# Patient Record
Sex: MALE | Race: WHITE | NOT HISPANIC OR LATINO
[De-identification: names, ages, dates, MRNs, and addresses within clinical notes are randomized per-mention and may not be internally consistent; named-entity substitution may affect disease eponyms.]

---

## 2017-03-23 ENCOUNTER — MEDICATION RENEWAL (OUTPATIENT)
Age: 62
End: 2017-03-23

## 2017-04-25 ENCOUNTER — APPOINTMENT (OUTPATIENT)
Dept: INTERNAL MEDICINE | Facility: CLINIC | Age: 62
End: 2017-04-25

## 2017-04-25 ENCOUNTER — NON-APPOINTMENT (OUTPATIENT)
Age: 62
End: 2017-04-25

## 2017-04-25 VITALS
WEIGHT: 155 LBS | HEIGHT: 69.5 IN | HEART RATE: 63 BPM | OXYGEN SATURATION: 99 % | DIASTOLIC BLOOD PRESSURE: 80 MMHG | BODY MASS INDEX: 22.44 KG/M2 | SYSTOLIC BLOOD PRESSURE: 134 MMHG

## 2017-04-25 DIAGNOSIS — Z87.19 PERSONAL HISTORY OF OTHER DISEASES OF THE DIGESTIVE SYSTEM: ICD-10-CM

## 2017-05-02 LAB
25(OH)D3 SERPL-MCNC: 33.4 NG/ML
ALBUMIN SERPL ELPH-MCNC: 4.1 G/DL
ALP BLD-CCNC: 50 U/L
ALT SERPL-CCNC: 26 U/L
ANION GAP SERPL CALC-SCNC: 16 MMOL/L
APPEARANCE: CLEAR
AST SERPL-CCNC: 25 U/L
BACTERIA: NEGATIVE
BASOPHILS # BLD AUTO: 0.02 K/UL
BASOPHILS NFR BLD AUTO: 0.2 %
BILIRUB SERPL-MCNC: 0.4 MG/DL
BILIRUBIN URINE: NEGATIVE
BLOOD URINE: NEGATIVE
BUN SERPL-MCNC: 30 MG/DL
CALCIUM SERPL-MCNC: 10.3 MG/DL
CHLORIDE SERPL-SCNC: 102 MMOL/L
CHOLEST SERPL-MCNC: 178 MG/DL
CHOLEST/HDLC SERPL: 2.4 RATIO
CO2 SERPL-SCNC: 22 MMOL/L
COLOR: YELLOW
CREAT SERPL-MCNC: 1.1 MG/DL
EOSINOPHIL # BLD AUTO: 0.16 K/UL
EOSINOPHIL NFR BLD AUTO: 1.8 %
GLUCOSE QUALITATIVE U: NORMAL MG/DL
GLUCOSE SERPL-MCNC: 109 MG/DL
HCT VFR BLD CALC: 42.8 %
HDLC SERPL-MCNC: 73 MG/DL
HGB BLD-MCNC: 13.8 G/DL
IMM GRANULOCYTES NFR BLD AUTO: 0.1 %
KETONES URINE: NEGATIVE
LDLC SERPL CALC-MCNC: 95 MG/DL
LEUKOCYTE ESTERASE URINE: NEGATIVE
LYMPHOCYTES # BLD AUTO: 3.95 K/UL
LYMPHOCYTES NFR BLD AUTO: 43.8 %
MAN DIFF?: NORMAL
MCHC RBC-ENTMCNC: 32.2 GM/DL
MCHC RBC-ENTMCNC: 32.2 PG
MCV RBC AUTO: 99.8 FL
MICROSCOPIC-UA: NORMAL
MONOCYTES # BLD AUTO: 0.51 K/UL
MONOCYTES NFR BLD AUTO: 5.7 %
NEUTROPHILS # BLD AUTO: 4.36 K/UL
NEUTROPHILS NFR BLD AUTO: 48.4 %
NITRITE URINE: NEGATIVE
PH URINE: 5.5
PLATELET # BLD AUTO: 252 K/UL
POTASSIUM SERPL-SCNC: 5.2 MMOL/L
PROT SERPL-MCNC: 7.7 G/DL
PROTEIN URINE: NEGATIVE MG/DL
PSA SERPL-MCNC: 2.24 NG/ML
RBC # BLD: 4.29 M/UL
RBC # FLD: 13.3 %
RED BLOOD CELLS URINE: 4 /HPF
SODIUM SERPL-SCNC: 140 MMOL/L
SPECIFIC GRAVITY URINE: 1.03
SQUAMOUS EPITHELIAL CELLS: 0 /HPF
TRIGL SERPL-MCNC: 51 MG/DL
UROBILINOGEN URINE: NORMAL MG/DL
WBC # FLD AUTO: 9.01 K/UL
WHITE BLOOD CELLS URINE: 0 /HPF

## 2017-07-07 ENCOUNTER — MEDICATION RENEWAL (OUTPATIENT)
Age: 62
End: 2017-07-07

## 2018-08-01 ENCOUNTER — RX RENEWAL (OUTPATIENT)
Age: 63
End: 2018-08-01

## 2019-05-22 ENCOUNTER — RX RENEWAL (OUTPATIENT)
Age: 64
End: 2019-05-22

## 2019-05-23 ENCOUNTER — OTHER (OUTPATIENT)
Age: 64
End: 2019-05-23

## 2019-05-23 ENCOUNTER — MEDICATION RENEWAL (OUTPATIENT)
Age: 64
End: 2019-05-23

## 2019-09-11 ENCOUNTER — RX RENEWAL (OUTPATIENT)
Age: 64
End: 2019-09-11

## 2019-11-01 ENCOUNTER — NON-APPOINTMENT (OUTPATIENT)
Age: 64
End: 2019-11-01

## 2019-11-01 ENCOUNTER — LABORATORY RESULT (OUTPATIENT)
Age: 64
End: 2019-11-01

## 2019-11-01 ENCOUNTER — APPOINTMENT (OUTPATIENT)
Dept: INTERNAL MEDICINE | Facility: CLINIC | Age: 64
End: 2019-11-01
Payer: COMMERCIAL

## 2019-11-01 VITALS
HEART RATE: 67 BPM | DIASTOLIC BLOOD PRESSURE: 80 MMHG | HEIGHT: 69.5 IN | OXYGEN SATURATION: 97 % | BODY MASS INDEX: 22.73 KG/M2 | WEIGHT: 157 LBS | SYSTOLIC BLOOD PRESSURE: 120 MMHG

## 2019-11-01 PROCEDURE — G0442 ANNUAL ALCOHOL SCREEN 15 MIN: CPT | Mod: NC

## 2019-11-01 PROCEDURE — 93000 ELECTROCARDIOGRAM COMPLETE: CPT

## 2019-11-01 PROCEDURE — G0444 DEPRESSION SCREEN ANNUAL: CPT | Mod: NC,59

## 2019-11-01 PROCEDURE — 99396 PREV VISIT EST AGE 40-64: CPT | Mod: 25

## 2019-11-04 NOTE — PHYSICAL EXAM
[No Acute Distress] : no acute distress [Well Nourished] : well nourished [Well Developed] : well developed [Well-Appearing] : well-appearing [Normal Sclera/Conjunctiva] : normal sclera/conjunctiva [PERRL] : pupils equal round and reactive to light [EOMI] : extraocular movements intact [Normal Outer Ear/Nose] : the outer ears and nose were normal in appearance [Normal Oropharynx] : the oropharynx was normal [No JVD] : no jugular venous distention [No Lymphadenopathy] : no lymphadenopathy [Supple] : supple [Thyroid Normal, No Nodules] : the thyroid was normal and there were no nodules present [No Respiratory Distress] : no respiratory distress  [No Accessory Muscle Use] : no accessory muscle use [Clear to Auscultation] : lungs were clear to auscultation bilaterally [Normal Rate] : normal rate  [Regular Rhythm] : with a regular rhythm [Normal S1, S2] : normal S1 and S2 [No Murmur] : no murmur heard [No Carotid Bruits] : no carotid bruits [No Abdominal Bruit] : a ~M bruit was not heard ~T in the abdomen [No Varicosities] : no varicosities [Pedal Pulses Present] : the pedal pulses are present [No Edema] : there was no peripheral edema [No Palpable Aorta] : no palpable aorta [No Extremity Clubbing/Cyanosis] : no extremity clubbing/cyanosis [Soft] : abdomen soft [Non Tender] : non-tender [Non-distended] : non-distended [No Masses] : no abdominal mass palpated [No HSM] : no HSM [Normal Bowel Sounds] : normal bowel sounds [Declined Rectal Exam] : declined rectal exam [Normal Posterior Cervical Nodes] : no posterior cervical lymphadenopathy [Normal Anterior Cervical Nodes] : no anterior cervical lymphadenopathy [No CVA Tenderness] : no CVA  tenderness [No Spinal Tenderness] : no spinal tenderness [No Joint Swelling] : no joint swelling [Grossly Normal Strength/Tone] : grossly normal strength/tone [No Rash] : no rash [Coordination Grossly Intact] : coordination grossly intact [No Focal Deficits] : no focal deficits [Normal Gait] : normal gait [Deep Tendon Reflexes (DTR)] : deep tendon reflexes were 2+ and symmetric [Normal Affect] : the affect was normal [Normal Insight/Judgement] : insight and judgment were intact [FreeTextEntry1] : refer for colonsocpy screening [de-identified] : defer

## 2019-11-04 NOTE — HEALTH RISK ASSESSMENT
[] : No [Yes] : Yes [Monthly or less (1 pt)] : Monthly or less (1 point) [1 or 2 (0 pts)] : 1 or 2 (0 points) [Never (0 pts)] : Never (0 points) [No falls in past year] : Patient reported no falls in the past year [0] : 2) Feeling down, depressed, or hopeless: Not at all (0) [Audit-CScore] : 1 [APL0Dmlha] : 0 [BoneDensityComments] : none [Patient/Caregiver not ready to engage] : Patient/Caregiver not ready to engage

## 2019-11-04 NOTE — HISTORY OF PRESENT ILLNESS
[FreeTextEntry1] : 64 year old male, ,  with 2 children, monogomous for past 19 years, seen in 2004, re-establish care in 2012 (last seen by me ),here for CPE. \par He is triathlete, and has run many marathons, running, bicycling and swimming, scheduled to run Oct 201 marathin in [facility]..\par \par He has known RBBB and hypertension with extensive cardiac work-up including CT chest/coronary angiography as indicated in prior notes, most recently had nuclear stress test in 2012 with cardiologist  Dr Haywood that was negative and begun on lipitor for his hyperlipidemia. .\par His blood pressure is controlled on medications since it was noted to be high in 2012 when he was gettingg  treatment for his chronic LBP.  \par He has been an active marathon runner since his mid 30's after his father had CABG and PPM in his mid 60's.  His mother had  from liver disease related to alcohol.  He has hx atypical chest pain for many years, thought to be related to possible GERD as he does drink coffee and orange juice throughout the day, some water .  He does not smoke and drinks only socially, no recreational drugs.\par \par He has hx of 3 arthroscopic surgeries on his knees for meniscal tears, (Two on right, one on left), last one in 2007. He was seen in  for inflammation of the 3rd digit of foot, received steroid injection with response.\par \par Never had colonoscopy.

## 2019-11-18 LAB
25(OH)D3 SERPL-MCNC: 31.9 NG/ML
ALBUMIN SERPL ELPH-MCNC: 4.8 G/DL
ALP BLD-CCNC: 51 U/L
ALT SERPL-CCNC: 21 U/L
ANION GAP SERPL CALC-SCNC: 13 MMOL/L
AST SERPL-CCNC: 19 U/L
BASOPHILS # BLD AUTO: 0.03 K/UL
BASOPHILS NFR BLD AUTO: 0.3 %
BILIRUB SERPL-MCNC: 0.5 MG/DL
BUN SERPL-MCNC: 31 MG/DL
CALCIUM SERPL-MCNC: 10.5 MG/DL
CHLORIDE SERPL-SCNC: 103 MMOL/L
CHOLEST SERPL-MCNC: 210 MG/DL
CHOLEST/HDLC SERPL: 3.1 RATIO
CO2 SERPL-SCNC: 26 MMOL/L
CREAT SERPL-MCNC: 1.09 MG/DL
EOSINOPHIL # BLD AUTO: 0.24 K/UL
EOSINOPHIL NFR BLD AUTO: 2.4 %
ESTIMATED AVERAGE GLUCOSE: 117 MG/DL
GLUCOSE SERPL-MCNC: 112 MG/DL
HBA1C MFR BLD HPLC: 5.7 %
HCT VFR BLD CALC: 48.2 %
HCV AB SER QL: NONREACTIVE
HCV S/CO RATIO: 0.24 S/CO
HDLC SERPL-MCNC: 67 MG/DL
HGB BLD-MCNC: 15.4 G/DL
IMM GRANULOCYTES NFR BLD AUTO: 0.2 %
LDLC SERPL CALC-MCNC: 127 MG/DL
LYMPHOCYTES # BLD AUTO: 5.84 K/UL
LYMPHOCYTES NFR BLD AUTO: 59 %
MAN DIFF?: NORMAL
MCHC RBC-ENTMCNC: 32 GM/DL
MCHC RBC-ENTMCNC: 32.7 PG
MCV RBC AUTO: 102.3 FL
MONOCYTES # BLD AUTO: 0.55 K/UL
MONOCYTES NFR BLD AUTO: 5.6 %
NEUTROPHILS # BLD AUTO: 3.22 K/UL
NEUTROPHILS NFR BLD AUTO: 32.5 %
PLATELET # BLD AUTO: 293 K/UL
POTASSIUM SERPL-SCNC: 5.1 MMOL/L
PROT SERPL-MCNC: 7.5 G/DL
RBC # BLD: 4.71 M/UL
RBC # FLD: 12.8 %
SODIUM SERPL-SCNC: 141 MMOL/L
TRIGL SERPL-MCNC: 79 MG/DL
TSH SERPL-ACNC: 1.65 UIU/ML
WBC # FLD AUTO: 9.9 K/UL

## 2019-11-21 ENCOUNTER — TRANSCRIPTION ENCOUNTER (OUTPATIENT)
Age: 64
End: 2019-11-21

## 2020-10-21 ENCOUNTER — RX RENEWAL (OUTPATIENT)
Age: 65
End: 2020-10-21

## 2021-02-12 ENCOUNTER — TRANSCRIPTION ENCOUNTER (OUTPATIENT)
Age: 66
End: 2021-02-12

## 2021-02-17 ENCOUNTER — NON-APPOINTMENT (OUTPATIENT)
Age: 66
End: 2021-02-17

## 2021-02-18 ENCOUNTER — TRANSCRIPTION ENCOUNTER (OUTPATIENT)
Age: 66
End: 2021-02-18

## 2021-02-18 ENCOUNTER — APPOINTMENT (OUTPATIENT)
Dept: INTERNAL MEDICINE | Facility: CLINIC | Age: 66
End: 2021-02-18
Payer: COMMERCIAL

## 2021-02-18 PROCEDURE — 99213 OFFICE O/P EST LOW 20 MIN: CPT | Mod: 95

## 2021-02-19 ENCOUNTER — APPOINTMENT (OUTPATIENT)
Dept: PULMONOLOGY | Facility: CLINIC | Age: 66
End: 2021-02-19
Payer: COMMERCIAL

## 2021-02-19 VITALS — BODY MASS INDEX: 22.9 KG/M2 | HEIGHT: 70 IN | WEIGHT: 160 LBS

## 2021-02-19 DIAGNOSIS — Z86.39 PERSONAL HISTORY OF OTHER ENDOCRINE, NUTRITIONAL AND METABOLIC DISEASE: ICD-10-CM

## 2021-02-19 DIAGNOSIS — Z87.438 PERSONAL HISTORY OF OTHER DISEASES OF MALE GENITAL ORGANS: ICD-10-CM

## 2021-02-19 DIAGNOSIS — Z86.79 PERSONAL HISTORY OF OTHER DISEASES OF THE CIRCULATORY SYSTEM: ICD-10-CM

## 2021-02-19 PROCEDURE — 99204 OFFICE O/P NEW MOD 45 MIN: CPT | Mod: 95

## 2021-02-19 NOTE — ADDENDUM
[FreeTextEntry1] : Documented by Tamia Cleveland acting as a scribe for Dr. Anthony Hooker on 02/19/2021 \par \par All medical record entries made by the Scribe were at my, Dr. Anthony Hooker's, direction and personally dictated by me on 02/19/2021 . I have reviewed the chart and agree that the record accurately reflects my personal performance of the history, physical exam, assessment and plan. I have also personally directed, reviewed, and agree with the discharge instructions.

## 2021-02-19 NOTE — PHYSICAL EXAM
[No Acute Distress] : no acute distress [Well Nourished] : well nourished [Normal Appearance] : normal appearance [No Deformities] : no deformities [Well Developed] : well developed

## 2021-02-19 NOTE — HISTORY OF PRESENT ILLNESS
[Home] : at home, [unfilled] , at the time of the visit. [Other Location: e.g. Home (Enter Location, City,State)___] : at [unfilled] [Verbal consent obtained from patient] : the patient, [unfilled] [Time Spent: ___ minutes] : I have spent [unfilled] minutes with the patient on the telephone [FreeTextEntry1] : Hx as outlined.\par History of borderline cholesterol on statin therapy and reinforced diet history of borderline hypertension and will adjust diet before beginning medications\par History of hyperlipidemia on statin therapy to follow-up labs today

## 2021-02-19 NOTE — ASSESSMENT
[FreeTextEntry1] : Mr. VILLA  is a 66 year old male with a history of  HTN, cholesterol, BPH,likely suspected COVID-19 infection who now comes to the office via video call  for an initial pulmonary evaluation for suspected COVID with pedestrian symptoms \par \par \par problem 1: likely suspected COVID-19 Infection\par - recommended to quarantine for 10 days \par Immune Support Recommendations:\par -OTC Vitamin C 1000mg BID \par -OTC Quercetin 1000mg BID \par -OTC Zinc 50-100mg per day \par -OTC Melatonin 5mg a night \par -OTC Vitamin D 2000mg per day  \par - recommended hydration \par - recommended Baby Aspirin \par - if positive for COVID-19 no COVID-19 vaccine for 3 months. \par \par problem 2: allergies / sinus\par  -add Flonase 1 sniff/nostril BID\par - Environmental measures for allergies were encouraged including mattress and pillow cover, air purifier, and environmental controls.\par \par \par Problem 3: primary snoring\par - recommended positional sleep \par \par \par problem 4: health maintenance \par - breathing techniques: Wimhof and Buteyko method \par -recommended yearly flu shot after October 15\par -recommended strep pneumonia vaccines: Prevnar-13 vaccine, followed by Pneumo vaccine 23 one year following after 65 years old. \par -recommended early intervention for Upper Respiratory Infections (URIs)\par -recommended regular osteoporosis evaluations\par -recommended early dermatological evaluations\par -recommended after the age of 50 to the age of 70, colonoscopy every 5 years\par \par F/U in 6-8 weeks.\par He is encouraged to call with any changes, concerns, or questions\par \par

## 2021-02-19 NOTE — REASON FOR VISIT
[Initial] : an initial visit [TextBox_44] : via video call - suspected likely COVID-19 infection, primary snoring

## 2021-02-19 NOTE — HISTORY OF PRESENT ILLNESS
[Home] : at home, [unfilled] , at the time of the visit. [Medical Office: ([facility])___] : at the medical office located in  [Verbal consent obtained from patient] : the patient, [unfilled] [TextBox_4] : Mr. VILLA is a 66 year old male presenting to the office today via video call  for initial pulmonary evaluation. His chief complaint is\par - - he notes he was exposed to someone with COVID, he took a test last Friday, his wife tested positive, he tested negative. He has been feeling under the weather but not that sick. \par - he took another test yesterday with Niels. \par - he has been feeling more tired than he usually would be, in the beginning he felt like he had some chest pains, he has had chest pains for years \par -  he denies any SOB \par - no difficulty swallowing \par - no sour taste in the mouth\par - he thinks he has heartburn, he takes an antacid, this started a week ago.\par - he notes he snores \par - he notes his neck size is 15 1/2 \par - he is about 5'10 and weights 160 lbs\par - his memory / concentration is fine \par - no ankle / leg swelling \par - he notes he sometimes gets some allergies \par - he notes he takes Flonase \par - bowels are regular for the most part \par - no hoarseness \par - he wakes up at night to urinate \par -he denies any visual issues, headaches, nausea, vomiting, fever, chills, sweats, chest pain, chest pressure, diarrhea, constipation, dysphagia, dizziness, leg swelling, leg pain, itchy eyes, itchy ears,  sour taste in the mouth, myalgias or arthralgias.

## 2021-02-23 ENCOUNTER — TRANSCRIPTION ENCOUNTER (OUTPATIENT)
Age: 66
End: 2021-02-23

## 2021-02-24 ENCOUNTER — NON-APPOINTMENT (OUTPATIENT)
Age: 66
End: 2021-02-24

## 2021-03-02 ENCOUNTER — APPOINTMENT (OUTPATIENT)
Dept: CARDIOLOGY | Facility: CLINIC | Age: 66
End: 2021-03-02
Payer: COMMERCIAL

## 2021-03-02 ENCOUNTER — NON-APPOINTMENT (OUTPATIENT)
Age: 66
End: 2021-03-02

## 2021-03-02 ENCOUNTER — LABORATORY RESULT (OUTPATIENT)
Age: 66
End: 2021-03-02

## 2021-03-02 VITALS — DIASTOLIC BLOOD PRESSURE: 85 MMHG | SYSTOLIC BLOOD PRESSURE: 150 MMHG

## 2021-03-02 VITALS
WEIGHT: 160 LBS | HEIGHT: 70 IN | BODY MASS INDEX: 22.9 KG/M2 | SYSTOLIC BLOOD PRESSURE: 164 MMHG | HEART RATE: 65 BPM | OXYGEN SATURATION: 100 % | DIASTOLIC BLOOD PRESSURE: 88 MMHG

## 2021-03-02 PROCEDURE — 99072 ADDL SUPL MATRL&STAF TM PHE: CPT

## 2021-03-02 PROCEDURE — 99204 OFFICE O/P NEW MOD 45 MIN: CPT

## 2021-03-02 PROCEDURE — 36415 COLL VENOUS BLD VENIPUNCTURE: CPT

## 2021-03-02 PROCEDURE — 93000 ELECTROCARDIOGRAM COMPLETE: CPT

## 2021-03-02 NOTE — ASSESSMENT
[FreeTextEntry1] : Mr Mart is feeling generally well and is resuming his usual training schedule.  His exam shows elevated blood pressure, regular rhythm, clear lungs, and a normal cardiac exam.  His EKG shows incomplete right bundle branch block which has been noted in the past.\par \par He seems to have recovered from coronavirus and appears to be doing well.  He was instructed to take his blood pressure at home to see if it is consistently elevated.  Complete blood work was drawn.  He will be scheduled for a stress echo.

## 2021-03-02 NOTE — PHYSICAL EXAM
[General Appearance - Well Developed] : well developed [Normal Appearance] : normal appearance [Well Groomed] : well groomed [General Appearance - Well Nourished] : well nourished [No Deformities] : no deformities [General Appearance - In No Acute Distress] : no acute distress [Normal Conjunctiva] : the conjunctiva exhibited no abnormalities [Eyelids - No Xanthelasma] : the eyelids demonstrated no xanthelasmas [Normal Oral Mucosa] : normal oral mucosa [No Oral Pallor] : no oral pallor [No Oral Cyanosis] : no oral cyanosis [Normal Jugular Venous A Waves Present] : normal jugular venous A waves present [Normal Jugular Venous V Waves Present] : normal jugular venous V waves present [No Jugular Venous Mae A Waves] : no jugular venous mae A waves [Heart Rate And Rhythm] : heart rate and rhythm were normal [Heart Sounds] : normal S1 and S2 [Murmurs] : no murmurs present [Respiration, Rhythm And Depth] : normal respiratory rhythm and effort [Exaggerated Use Of Accessory Muscles For Inspiration] : no accessory muscle use [Auscultation Breath Sounds / Voice Sounds] : lungs were clear to auscultation bilaterally [Abdomen Soft] : soft [Abdomen Tenderness] : non-tender [Abdomen Mass (___ Cm)] : no abdominal mass palpated [Abnormal Walk] : normal gait [Gait - Sufficient For Exercise Testing] : the gait was sufficient for exercise testing [Nail Clubbing] : no clubbing of the fingernails [Cyanosis, Localized] : no localized cyanosis [Petechial Hemorrhages (___cm)] : no petechial hemorrhages [Skin Color & Pigmentation] : normal skin color and pigmentation [] : no rash [No Venous Stasis] : no venous stasis [Skin Lesions] : no skin lesions [No Skin Ulcers] : no skin ulcer [No Xanthoma] : no  xanthoma was observed [Oriented To Time, Place, And Person] : oriented to person, place, and time [Affect] : the affect was normal [Mood] : the mood was normal [No Anxiety] : not feeling anxious

## 2021-03-02 NOTE — HISTORY OF PRESENT ILLNESS
[FreeTextEntry1] : 66-year-old male with a history of hypertension and hypercholesterolemia being seen because of concerns of CAD.  He has a family history of premature CAD with his father having had bypass surgery in his 60s and was evaluated by us about 12 years ago with negative work-up.  He remains asymptomatic and is very physically active participating in  long distance running and triathlons and has not had any symptoms.  About 3 weeks ago he developed coronavirus infection with mild symptoms of fever and malaise for a few days.  Is currently asymptomatic and has been gradually returning to usual activities.\par \par He is in otherwise good general health.  He has not seen his internist in more than a year.

## 2021-03-03 LAB
ALBUMIN SERPL ELPH-MCNC: 4.6 G/DL
ALP BLD-CCNC: 69 U/L
ALT SERPL-CCNC: 39 U/L
ANION GAP SERPL CALC-SCNC: 14 MMOL/L
AST SERPL-CCNC: 33 U/L
BASOPHILS # BLD AUTO: 0 K/UL
BASOPHILS NFR BLD AUTO: 0 %
BILIRUB SERPL-MCNC: 0.4 MG/DL
BUN SERPL-MCNC: 29 MG/DL
CALCIUM SERPL-MCNC: 10.1 MG/DL
CHLORIDE SERPL-SCNC: 104 MMOL/L
CHOLEST SERPL-MCNC: 160 MG/DL
CO2 SERPL-SCNC: 21 MMOL/L
CREAT SERPL-MCNC: 1.17 MG/DL
EOSINOPHIL # BLD AUTO: 0 K/UL
EOSINOPHIL NFR BLD AUTO: 0 %
ESTIMATED AVERAGE GLUCOSE: 123 MG/DL
GLUCOSE SERPL-MCNC: 103 MG/DL
HBA1C MFR BLD HPLC: 5.9 %
HCT VFR BLD CALC: 46.5 %
HDLC SERPL-MCNC: 46 MG/DL
HGB BLD-MCNC: 14.8 G/DL
LDLC SERPL CALC-MCNC: 99 MG/DL
LYMPHOCYTES # BLD AUTO: 7.13 K/UL
LYMPHOCYTES NFR BLD AUTO: 60.9 %
MAN DIFF?: NORMAL
MCHC RBC-ENTMCNC: 31.8 GM/DL
MCHC RBC-ENTMCNC: 31.8 PG
MCV RBC AUTO: 100 FL
MONOCYTES # BLD AUTO: 0.61 K/UL
MONOCYTES NFR BLD AUTO: 5.2 %
NEUTROPHILS # BLD AUTO: 3.05 K/UL
NEUTROPHILS NFR BLD AUTO: 26.1 %
NONHDLC SERPL-MCNC: 114 MG/DL
PLATELET # BLD AUTO: 418 K/UL
POTASSIUM SERPL-SCNC: 4.7 MMOL/L
PROT SERPL-MCNC: 7.5 G/DL
PSA SERPL-MCNC: 3.4 NG/ML
RBC # BLD: 4.65 M/UL
RBC # FLD: 12.5 %
SODIUM SERPL-SCNC: 140 MMOL/L
TRIGL SERPL-MCNC: 77 MG/DL
TSH SERPL-ACNC: 1.93 UIU/ML
WBC # FLD AUTO: 11.7 K/UL

## 2021-03-16 ENCOUNTER — TRANSCRIPTION ENCOUNTER (OUTPATIENT)
Age: 66
End: 2021-03-16

## 2021-03-22 ENCOUNTER — APPOINTMENT (OUTPATIENT)
Dept: PULMONOLOGY | Facility: CLINIC | Age: 66
End: 2021-03-22
Payer: COMMERCIAL

## 2021-03-22 VITALS
DIASTOLIC BLOOD PRESSURE: 78 MMHG | WEIGHT: 158 LBS | RESPIRATION RATE: 16 BRPM | BODY MASS INDEX: 23.4 KG/M2 | OXYGEN SATURATION: 98 % | HEART RATE: 65 BPM | HEIGHT: 69 IN | TEMPERATURE: 97.3 F | SYSTOLIC BLOOD PRESSURE: 126 MMHG

## 2021-03-22 PROCEDURE — 99072 ADDL SUPL MATRL&STAF TM PHE: CPT

## 2021-03-22 PROCEDURE — 94010 BREATHING CAPACITY TEST: CPT

## 2021-03-22 PROCEDURE — 71046 X-RAY EXAM CHEST 2 VIEWS: CPT

## 2021-03-22 PROCEDURE — 94729 DIFFUSING CAPACITY: CPT

## 2021-03-22 PROCEDURE — 99214 OFFICE O/P EST MOD 30 MIN: CPT | Mod: 25

## 2021-03-22 PROCEDURE — 94727 GAS DIL/WSHOT DETER LNG VOL: CPT

## 2021-03-22 NOTE — ASSESSMENT
[FreeTextEntry1] : Mr. VILLA  is a 66 year old male with a history of  HTN, cholesterol, BPH,likely suspected COVID-19 infection who now comes to the office for a pulmonary evaluation for suspected COVID with pedestrian symptoms (resolved); still snoring\par \par \par problem 1: s/p suspected COVID-19 Infection\par - recommended to quarantine for 10 days \par -hold vaccine x 3 months\par -complete COVID 19 Antibody screening \par -educated patient on COVID 19 vaccine and appropriate recommendations \par Immune Support Recommendations:\par -OTC Vitamin C 1000mg BID \par -OTC Quercetin 1000mg BID \par -OTC Zinc 50-100mg per day \par -OTC Melatonin 5mg a night \par -OTC Vitamin D 2000mg per day  \par - recommended hydration \par - recommended Baby Aspirin \par \par problem 2: allergies / sinus\par  -continue Flonase 1 sniff/nostril BID\par - Environmental measures for allergies were encouraged including mattress and pillow cover, air purifier, and environmental controls.\par \par Problem 3: primary snoring/ ?RESHMA\par - recommended positional sleep \par -complete home sleep study\par -recommended Slumber Bump\par -recommended Somnifix \par -recommended OxyAid\par Good sleep hygiene was encouraged including wearing sunglasses 30 minutes before bed, avoiding watching television an hour before bed, keeping caffeine at a low, avoiding reading, television, or anything, in bed, no drinking any liquids three hours before bedtime, and only getting into bed when tired and ready for sleep. \par \par problem 4: health maintenance \par - breathing techniques: Wimhof and Buteyko method \par -recommended yearly flu shot after October 15\par -recommended strep pneumonia vaccines: Prevnar-13 vaccine, followed by Pneumo vaccine 23 one year following after 65 years old. \par -recommended early intervention for Upper Respiratory Infections (URIs)\par -recommended regular osteoporosis evaluations\par -recommended early dermatological evaluations\par -recommended after the age of 50 to the age of 70, colonoscopy every 5 years\par \par F/U in 6-8 weeks.\par He is encouraged to call with any changes, concerns, or questions\par \par

## 2021-03-22 NOTE — ADDENDUM
[FreeTextEntry1] : Documented by Van Obrien acting as a scribe for Dr. Anthony Hooker on 03/22/2021.\par \par All medical record entries made by the Scribe were at my, Dr. Anthony Hooker's, direction and personally dictated by me on 03/22/2021 . I have reviewed the chart and agree that the record accurately reflects my personal performance of the history, physical exam, assessment and plan. I have also personally directed, reviewed, and agree with the discharge instructions. \par

## 2021-03-22 NOTE — REASON FOR VISIT
[Follow-Up] : a follow-up visit [TextBox_44] :  suspected likely COVID-19 infection, primary snoring

## 2021-03-22 NOTE — HISTORY OF PRESENT ILLNESS
[TextBox_4] : Mr. VILLA is a 66 year old male presenting to the office today for pulmonary follow up. His chief complaint is\par \par -he notes generally feeling improved\par -he notes returned to exercise, tolerating well running, swimming, cycling\par -he notes sleeping enough, with quality fluctuating\par -he denies excessive dreaming or nightmares\par -he notes weight lower than baseline\par -he notes rare intermittent cough\par -he denies wheeze\par -he notes snores\par -he denies waking fatigued\par -he notes sinuses are quiet\par -he notes rhinorrhea exacerbated by exertion\par -he denies he could fall asleep while watching a boring TV show \par \par -denies any fevers, chills, sweats, chest pain, chest pressure, diarrhea, constipation, dysphagia, sour taste in the mouth, dizziness, leg swelling, leg pain, myalgias, arthralgias, itchy eyes, itchy ears, heartburn, or reflux.\par \par

## 2021-03-22 NOTE — PROCEDURE
[FreeTextEntry1] : Full PFT revealed mild restrictive dysfunction, with a FEV1 of  3.11 L, which is  90 % of predicted, normal lung volumes, and a diffusion of  29.1, which is  139 % of predicted, with a normal flow volume loop \par \par CXR reveals a normal sized heart; no evidence of infiltrate or effusion--a normal appearing chest radiograph\par  \par -Images and procedures reviewed in detail and discussed with patient.

## 2021-03-23 ENCOUNTER — TRANSCRIPTION ENCOUNTER (OUTPATIENT)
Age: 66
End: 2021-03-23

## 2021-03-23 LAB
COVID-19 NUCLEOCAPSID  GAM ANTIBODY INTERPRETATION: POSITIVE
SARS-COV-2 AB SERPL QL IA: 150 INDEX

## 2021-04-16 ENCOUNTER — APPOINTMENT (OUTPATIENT)
Dept: CARDIOLOGY | Facility: CLINIC | Age: 66
End: 2021-04-16
Payer: COMMERCIAL

## 2021-04-16 PROCEDURE — 93351 STRESS TTE COMPLETE: CPT

## 2021-04-16 PROCEDURE — 93320 DOPPLER ECHO COMPLETE: CPT

## 2021-04-16 PROCEDURE — 99072 ADDL SUPL MATRL&STAF TM PHE: CPT

## 2021-04-16 PROCEDURE — 93325 DOPPLER ECHO COLOR FLOW MAPG: CPT

## 2021-07-15 ENCOUNTER — NON-APPOINTMENT (OUTPATIENT)
Age: 66
End: 2021-07-15

## 2021-07-15 ENCOUNTER — APPOINTMENT (OUTPATIENT)
Dept: PULMONOLOGY | Facility: CLINIC | Age: 66
End: 2021-07-15
Payer: COMMERCIAL

## 2021-07-15 VITALS
WEIGHT: 160 LBS | DIASTOLIC BLOOD PRESSURE: 70 MMHG | TEMPERATURE: 97.5 F | SYSTOLIC BLOOD PRESSURE: 130 MMHG | OXYGEN SATURATION: 98 % | HEART RATE: 70 BPM | RESPIRATION RATE: 16 BRPM | BODY MASS INDEX: 23.7 KG/M2 | HEIGHT: 69 IN

## 2021-07-15 PROCEDURE — 95012 NITRIC OXIDE EXP GAS DETER: CPT

## 2021-07-15 PROCEDURE — 94010 BREATHING CAPACITY TEST: CPT

## 2021-07-15 PROCEDURE — 99214 OFFICE O/P EST MOD 30 MIN: CPT | Mod: 25

## 2021-07-15 PROCEDURE — 99072 ADDL SUPL MATRL&STAF TM PHE: CPT

## 2021-07-15 PROCEDURE — 94618 PULMONARY STRESS TESTING: CPT

## 2021-07-15 NOTE — PROCEDURE
[FreeTextEntry1] : FENO was 27; a normal value being less than 25\par Fractional exhaled nitric oxide (FENO) is regarded as a simple, noninvasive method for assessing eosinophilic airway inflammation. Produced by a variety of cells within the lung, nitric oxide (NO) concentrations are generally low in healthy individuals. However, high concentrations of NO appear to be involved in nonspecific host defense mechanisms and chronic inflammatory diseases such as asthma. The American Thoracic Society (ATS) therefore has recommended using FENO to aid in the diagnosis and monitoring of eosinophilic airway inflammation and asthma, and for identifying steroid responsive individuals whose chronic respiratory symptoms may be caused by airway inflammation. \par \par PFT revealed normal flows, with a FEV1 of 3.14 L, which is 94% of predicted, normal lung volumes, and with a normal flow volume loop. \par \par 6 minute walk test reveals a low saturation of 97%  \par with very slight evidence of dyspnea or fatigue; walked   586.8  \par meters.\par

## 2021-07-15 NOTE — ADDENDUM
[FreeTextEntry1] : Documented by Van Obrien acting as a scribe for Dr. Anthony Hooker on 07/15/2021.\par \par All medical record entries made by the Scribe were at my, Dr. Anthony Hooker's, direction and personally dictated by me on 07/15/2021 . I have reviewed the chart and agree that the record accurately reflects my personal performance of the history, physical exam, assessment and plan. I have also personally directed, reviewed, and agree with the discharge instructions. \par

## 2021-07-15 NOTE — HISTORY OF PRESENT ILLNESS
[TextBox_4] : Mr. VILLA is a 66 year old male presenting to the office today for pulmonary follow up. 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 Haywood Stress test work up\par -he notes intermittent heartburn and reflux\par -he notes stable orthopedically\par -he notes sinuses mildly congestion, but improved from baseline, treated with antihistamine \par -he notes snores\par -he notes sleep study pending with current insurance conflict \par \par -denies any chest pain, chest pressure, diarrhea, constipation, dysphagia, sour taste in the mouth, dizziness, leg swelling, leg pain, myalgias, arthralgias, itchy eyes, itchy ears.

## 2021-07-15 NOTE — ASSESSMENT
[FreeTextEntry1] : Mr. VILLA  is a 66 year old male with a history of  HTN, cholesterol, BPH,likely suspected COVID-19 infection who now comes to the office for a pulmonary evaluation for suspected COVID with pedestrian symptoms (resolved); still snoring ([place]) \par \par \par problem 1: s/p suspected COVID-19 Infection (+ AB) \par - recommended to quarantine for 10 days \par -s/p Pfizer COVID 19 vaccine x 2\par -s/p COVID 19 Antibody screening (+) \par -educated patient on COVID 19 vaccine and appropriate recommendations \par Immune Support Recommendations:\par -OTC Vitamin C 1000mg BID \par -OTC Quercetin 1000mg BID \par -OTC Zinc 50-100mg per day \par -OTC Melatonin 5mg a night \par -OTC Vitamin D 2000mg per day  \par - recommended hydration \par - recommended Baby Aspirin \par \par Problem 1A: RADS\par -add Ventolin 2 puffs Q6H, pre-exercise\par Asthma is believed to be caused by inherited (genetic) and environmental factor, but its exact cause is unknown. Asthma may be triggered by allergens, lung infections, or irritants in the air. Asthma triggers are different for each person \par -Inhaler technique reviewed as well as oral hygiene techniques reviewed with patient. Avoidance of cold air, extremes of temperature, rescue inhaler should be used before exercise. Order of medication reviewed with patient. Recommended use of a cool mist humidifier in the bedroom. \par \par problem 2: allergies / sinus\par  -continue Flonase 1 sniff/nostril BID\par - Environmental measures for allergies were encouraged including mattress and pillow cover, air purifier, and environmental controls.\par \par Problem 3: primary snoring/ ?RESHMA\par - recommended positional sleep \par -complete home sleep study ([place] due to insurance conflict) \par -recommended Slumber Bump\par -recommended Somnifix \par -recommended OxyAid\par Good sleep hygiene was encouraged including wearing sunglasses 30 minutes before bed, avoiding watching television an hour before bed, keeping caffeine at a low, avoiding reading, television, or anything, in bed, no drinking any liquids three hours before bedtime, and only getting into bed when tired and ready for sleep. \par \par problem 4: health maintenance \par - breathing techniques: Wimhof and Buteyko method \par -recommended yearly flu shot after October 15\par -recommended strep pneumonia vaccines: Prevnar-13 vaccine, followed by Pneumo vaccine 23 one year following after 65 years old. \par -recommended early intervention for Upper Respiratory Infections (URIs)\par -recommended regular osteoporosis evaluations\par -recommended early dermatological evaluations\par -recommended after the age of 50 to the age of 70, colonoscopy every 5 years\par \par F/U in 6-8 weeks.\par He is encouraged to call with any changes, concerns, or questions\par \par

## 2021-07-15 NOTE — REASON FOR VISIT
[Follow-Up] : a follow-up visit [TextBox_44] :  suspected likely COVID-19 infection, primary snoring, SOB, PND

## 2021-07-15 NOTE — COUNSELING
[Inadequate social support] : Inadequate social support [Financial issues] : Financial issues [Needs reinforcement, provided] : Patient needs reinforcement on understanding of lifestyle changes and steps needed to achieve self management goal; reinforcement was provided [de-identified] : insurance conflict to perform sleep study

## 2021-09-30 ENCOUNTER — RX RENEWAL (OUTPATIENT)
Age: 66
End: 2021-09-30

## 2021-11-17 ENCOUNTER — APPOINTMENT (OUTPATIENT)
Dept: PULMONOLOGY | Facility: CLINIC | Age: 66
End: 2021-11-17
Payer: COMMERCIAL

## 2021-11-17 VITALS
DIASTOLIC BLOOD PRESSURE: 80 MMHG | RESPIRATION RATE: 16 BRPM | BODY MASS INDEX: 24.55 KG/M2 | WEIGHT: 162 LBS | HEIGHT: 68 IN | HEART RATE: 71 BPM | OXYGEN SATURATION: 99 % | SYSTOLIC BLOOD PRESSURE: 140 MMHG | TEMPERATURE: 97.4 F

## 2021-11-17 PROCEDURE — 99214 OFFICE O/P EST MOD 30 MIN: CPT

## 2021-11-17 NOTE — COUNSELING
[Inadequate social support] : Inadequate social support [Financial issues] : Financial issues [Needs reinforcement, provided] : Patient needs reinforcement on understanding of lifestyle changes and steps needed to achieve self management goal; reinforcement was provided [de-identified] : insurance conflict to perform sleep study

## 2021-11-17 NOTE — ASSESSMENT
[FreeTextEntry1] : Mr. VILLA  is a 66 year old male with a history of  HTN, cholesterol, BPH,likely suspected COVID-19 infection who now comes to the office for a pulmonary evaluation for suspected COVID with pedestrian symptoms (resolved); still snoring (NC)- mild reduced stamina \par \par \par problem 1: s/p suspected COVID-19 Infection (s/p MAB)\par - recommended to quarantine for 10 days \par -s/p Pfizer COVID 19 vaccine x 2\par -s/p COVID 19 Antibody screening (+) \par -educated patient on COVID 19 vaccine and appropriate recommendations \par Immune Support Recommendations:\par -OTC Vitamin C 1000mg BID \par -OTC Quercetin 1000mg BID \par -OTC Zinc 50-100mg per day \par -OTC Melatonin 5mg a night \par -OTC Vitamin D 2000mg per day  \par - recommended hydration \par - recommended Baby Aspirin 30\par \par Problem 1A: RADS\par -add Ventolin 2 puffs Q6H, pre-exercise\par Asthma is believed to be caused by inherited (genetic) and environmental factor, but its exact cause is unknown. Asthma may be triggered by allergens, lung infections, or irritants in the air. Asthma triggers are different for each person \par -Inhaler technique reviewed as well as oral hygiene techniques reviewed with patient. Avoidance of cold air, extremes of temperature, rescue inhaler should be used before exercise. Order of medication reviewed with patient. Recommended use of a cool mist humidifier in the bedroom. \par \par problem 1B: poor breathing mechanics\par -recommended Wim Hof and Buteyko breathing techniques \par -recommended Ed Geraldo\par -Proper breathing techniques were reviewed with an emphasis of exhalation. Patient instructed to breath in for 1 second and out for four seconds. Patient was encouraged to not talk while walking. \par \par problem 2: allergies / sinus\par  -continue Flonase 1 sniff/nostril BID\par - Environmental measures for allergies were encouraged including mattress and pillow cover, air purifier, and environmental controls.\par \par Problem 3: primary snoring/ ?RESHMA\par - recommended positional sleep \par -complete home sleep study ([place] due to insurance conflict) \par -recommended Slumber Bump\par -recommended Somnifix \par -recommended OxyAid\par Good sleep hygiene was encouraged including wearing sunglasses 30 minutes before bed, avoiding watching television an hour before bed, keeping caffeine at a low, avoiding reading, television, or anything, in bed, no drinking any liquids three hours before bedtime, and only getting into bed when tired and ready for sleep. \par \par problem 4: health maintenance\par -recommended yearly flu shot after October 15 (completed 2021) \par -recommended strep pneumonia vaccines: Prevnar-13 vaccine, followed by Pneumo vaccine 23 one year following after 65 years old. \par -recommended early intervention for Upper Respiratory Infections (URIs)\par -recommended regular osteoporosis evaluations\par -recommended early dermatological evaluations\par -recommended after the age of 50 to the age of 70, colonoscopy every 5 years\par \par F/U in 6-8 weeks.\par He is encouraged to call with any changes, concerns, or questions\par \par

## 2021-11-17 NOTE — ADDENDUM
[FreeTextEntry1] : Documented by Daniela Park acting as a scribe for Dr. Anthony Hooker on 11/17/2021 \par \par All medical record entries made by the Scribe were at my, Dr. Anthony Hooker's, direction and personally dictated by me on 11/17/2021 . I have reviewed the chart and agree that the record accurately reflects my personal performance of the history, physical exam, assessment and plan. I have also personally directed, reviewed, and agree with the discharge instructions

## 2021-11-17 NOTE — HISTORY OF PRESENT ILLNESS
[TextBox_4] : Mr. VILLA is a 66 year old male presenting to the office today for pulmonary follow up. His chief complaint is\par \par -he notes feeling okay \par -he notes running marathon \par -He notes bowels are regular  \par -he notes intermittent chest pain due to heartburn\par -he notes getting enough sleep \par -he notes snoring and moving a lot \par -he denies muscle cramps and spasms\par -he denies PND \par -he denies allergy issues and using OTC if any issue\par -he notes walking up slowly but rested\par -He reports being able to fall asleep while watching a boring TV show \par -he notes difficulty running as fast as before \par -he denies taking any new medications, vitamins, or supplements. \par -he notes no issue with recovery \par -he notes back issues \par -he notes swimming with no issue \par \par - He  denies any visual issues, headaches, nausea, vomiting, fever, chills, sweats, chest pains, chest pressure, diarrhea, constipation, dysphagia, myalgia, dizziness, leg swelling, leg pain, itchy eyes, itchy ears, heartburn, reflux, or sour taste in the mouth.

## 2022-03-14 ENCOUNTER — RX RENEWAL (OUTPATIENT)
Age: 67
End: 2022-03-14

## 2022-04-21 ENCOUNTER — NON-APPOINTMENT (OUTPATIENT)
Age: 67
End: 2022-04-21

## 2022-04-21 ENCOUNTER — APPOINTMENT (OUTPATIENT)
Dept: INTERNAL MEDICINE | Facility: CLINIC | Age: 67
End: 2022-04-21
Payer: MEDICARE

## 2022-04-21 VITALS
DIASTOLIC BLOOD PRESSURE: 80 MMHG | HEIGHT: 68 IN | WEIGHT: 162 LBS | SYSTOLIC BLOOD PRESSURE: 140 MMHG | BODY MASS INDEX: 24.55 KG/M2 | HEART RATE: 72 BPM | OXYGEN SATURATION: 97 %

## 2022-04-21 DIAGNOSIS — R10.13 EPIGASTRIC PAIN: ICD-10-CM

## 2022-04-21 DIAGNOSIS — M54.50 LOW BACK PAIN, UNSPECIFIED: ICD-10-CM

## 2022-04-21 DIAGNOSIS — Z23 ENCOUNTER FOR IMMUNIZATION: ICD-10-CM

## 2022-04-21 DIAGNOSIS — Z20.822 CONTACT WITH AND (SUSPECTED) EXPOSURE TO COVID-19: ICD-10-CM

## 2022-04-21 PROCEDURE — G0442 ANNUAL ALCOHOL SCREEN 15 MIN: CPT | Mod: 59

## 2022-04-21 PROCEDURE — 93000 ELECTROCARDIOGRAM COMPLETE: CPT | Mod: 59

## 2022-04-21 PROCEDURE — G0438: CPT

## 2022-04-21 PROCEDURE — G0402 INITIAL PREVENTIVE EXAM: CPT

## 2022-04-21 PROCEDURE — 90677 PCV20 VACCINE IM: CPT

## 2022-04-21 PROCEDURE — 90471 IMMUNIZATION ADMIN: CPT

## 2022-04-22 ENCOUNTER — LABORATORY RESULT (OUTPATIENT)
Age: 67
End: 2022-04-22

## 2022-04-23 ENCOUNTER — NON-APPOINTMENT (OUTPATIENT)
Age: 67
End: 2022-04-23

## 2022-04-23 DIAGNOSIS — R74.8 ABNORMAL LEVELS OF OTHER SERUM ENZYMES: ICD-10-CM

## 2022-04-23 DIAGNOSIS — R53.83 OTHER FATIGUE: ICD-10-CM

## 2022-04-23 PROBLEM — R10.13 DYSPEPSIA: Status: ACTIVE | Noted: 2021-02-19

## 2022-04-23 PROBLEM — Z20.822 SUSPECTED COVID-19 VIRUS INFECTION: Status: RESOLVED | Noted: 2021-02-19 | Resolved: 2022-04-23

## 2022-04-23 NOTE — PHYSICAL EXAM
[No Acute Distress] : no acute distress [Well Nourished] : well nourished [Well Developed] : well developed [Well-Appearing] : well-appearing [Normal Sclera/Conjunctiva] : normal sclera/conjunctiva [PERRL] : pupils equal round and reactive to light [EOMI] : extraocular movements intact [Normal Outer Ear/Nose] : the outer ears and nose were normal in appearance [Normal Oropharynx] : the oropharynx was normal [No JVD] : no jugular venous distention [No Lymphadenopathy] : no lymphadenopathy [Supple] : supple [Thyroid Normal, No Nodules] : the thyroid was normal and there were no nodules present [No Respiratory Distress] : no respiratory distress  [No Accessory Muscle Use] : no accessory muscle use [Clear to Auscultation] : lungs were clear to auscultation bilaterally [Normal Rate] : normal rate  [Regular Rhythm] : with a regular rhythm [Normal S1, S2] : normal S1 and S2 [No Murmur] : no murmur heard [No Carotid Bruits] : no carotid bruits [No Abdominal Bruit] : a ~M bruit was not heard ~T in the abdomen [No Varicosities] : no varicosities [Pedal Pulses Present] : the pedal pulses are present [No Edema] : there was no peripheral edema [No Palpable Aorta] : no palpable aorta [No Extremity Clubbing/Cyanosis] : no extremity clubbing/cyanosis [Normal Appearance] : normal in appearance [No Axillary Lymphadenopathy] : no axillary lymphadenopathy [Soft] : abdomen soft [Non Tender] : non-tender [Non-distended] : non-distended [No Masses] : no abdominal mass palpated [No HSM] : no HSM [Normal Bowel Sounds] : normal bowel sounds [Normal Posterior Cervical Nodes] : no posterior cervical lymphadenopathy [Normal Anterior Cervical Nodes] : no anterior cervical lymphadenopathy [No CVA Tenderness] : no CVA  tenderness [No Spinal Tenderness] : no spinal tenderness [No Joint Swelling] : no joint swelling [Grossly Normal Strength/Tone] : grossly normal strength/tone [No Rash] : no rash [Coordination Grossly Intact] : coordination grossly intact [No Focal Deficits] : no focal deficits [Normal Gait] : normal gait [Deep Tendon Reflexes (DTR)] : deep tendon reflexes were 2+ and symmetric [Normal Affect] : the affect was normal [Normal Insight/Judgement] : insight and judgment were intact [Normal] : affect was normal and insight and judgment were intact

## 2022-04-23 NOTE — REVIEW OF SYSTEMS
[Fatigue] : fatigue [Heartburn] : heartburn [Back Pain] : back pain [Negative] : Psychiatric [Skin Rash] : no skin rash [FreeTextEntry5] : atypical chest pain after eating-more likely GERD

## 2022-04-23 NOTE — HISTORY OF PRESENT ILLNESS
[de-identified] : 68 yo male, , with hx HTN, HLD, FH CAD, GERD, COVID infection Feb 2021, here for [employer] annual wellness visit.\par He continues to be active, working out several times a week, use to run marathons and triathlons.\par He had COVID infection 2/19/2022 and recovered but notes some mild fatigue and reduced stamina when working out post COVID. He had received the Pfizer vaccine in 2021, and first booster by Nov 2021. He denies tick bite but is outdoor often when running, but no skin rash. He does note some occasional low back pain and stiffness in his joints and lower legs  after he works out several times a week\par He notes occasional chest pain at rest and if he eats late, can bring up some bland phlegm. He thinks it is his GERD but takes no medications for relief of symptoms.\par He has been offered colonoscopy at every appointment and refused but now at 67, he will consider and take a referral.\par He has concerns he might have sleep apnea and was referred by his pulmonologist for sleep studies but not done yet. He does snore  but no witnessed sleep apnea.\par He has FH of CAD in his dad with bypass in 60's, and after some of his friends close to his age had stents placed despite negative stress testing, he would like to pursue further cardiac testing, besides the negative treadmill stress echocardiogram  he had 4/16/2021. He has never smoked and he drinks average of 2 glasses wine daily with dinner.\par

## 2022-04-23 NOTE — HEALTH RISK ASSESSMENT
[Never] : Never [Yes] : Yes [4 or more  times a week (4 pts)] : 4 or more  times a week (4 points) [1 or 2 (0 pts)] : 1 or 2 (0 points) [Never (0 pts)] : Never (0 points) [No falls in past year] : Patient reported no falls in the past year [0] : 2) Feeling down, depressed, or hopeless: Not at all (0) [PHQ-2 Negative - No further assessment needed] : PHQ-2 Negative - No further assessment needed [de-identified] : cardiology Dr Haywood [de-identified] : 2 glasses of wine with dinner [Audit-CScore] : 4 [[id number]] : 0

## 2022-04-25 ENCOUNTER — APPOINTMENT (OUTPATIENT)
Dept: PULMONOLOGY | Facility: CLINIC | Age: 67
End: 2022-04-25
Payer: MEDICARE

## 2022-04-25 ENCOUNTER — NON-APPOINTMENT (OUTPATIENT)
Age: 67
End: 2022-04-25

## 2022-04-25 ENCOUNTER — TRANSCRIPTION ENCOUNTER (OUTPATIENT)
Age: 67
End: 2022-04-25

## 2022-04-25 VITALS
BODY MASS INDEX: 22.9 KG/M2 | HEIGHT: 70 IN | DIASTOLIC BLOOD PRESSURE: 72 MMHG | OXYGEN SATURATION: 98 % | RESPIRATION RATE: 16 BRPM | HEART RATE: 54 BPM | TEMPERATURE: 97.2 F | SYSTOLIC BLOOD PRESSURE: 130 MMHG | WEIGHT: 160 LBS

## 2022-04-25 LAB
25(OH)D3 SERPL-MCNC: 32.9 NG/ML
ALBUMIN SERPL ELPH-MCNC: 4.9 G/DL
ALP BLD-CCNC: 60 U/L
ALT SERPL-CCNC: 98 U/L
ANION GAP SERPL CALC-SCNC: 13 MMOL/L
APPEARANCE: CLEAR
AST SERPL-CCNC: 32 U/L
BACTERIA: NEGATIVE
BASOPHILS # BLD AUTO: 0.05 K/UL
BASOPHILS NFR BLD AUTO: 0.4 %
BILIRUB SERPL-MCNC: 0.5 MG/DL
BILIRUBIN URINE: NEGATIVE
BLOOD URINE: NEGATIVE
BUN SERPL-MCNC: 26 MG/DL
CALCIUM SERPL-MCNC: 10.5 MG/DL
CHLORIDE SERPL-SCNC: 99 MMOL/L
CHOLEST SERPL-MCNC: 204 MG/DL
CO2 SERPL-SCNC: 25 MMOL/L
COLOR: NORMAL
CREAT SERPL-MCNC: 1.05 MG/DL
EGFR: 79 ML/MIN/1.73M2
EOSINOPHIL # BLD AUTO: 0.55 K/UL
EOSINOPHIL NFR BLD AUTO: 4.8 %
ESTIMATED AVERAGE GLUCOSE: 120 MG/DL
FERRITIN SERPL-MCNC: 290 NG/ML
GGT SERPL-CCNC: 27 U/L
GLUCOSE QUALITATIVE U: NEGATIVE
GLUCOSE SERPL-MCNC: 82 MG/DL
HAV IGM SER QL: NONREACTIVE
HBA1C MFR BLD HPLC: 5.8 %
HBV SURFACE AB SER QL: NONREACTIVE
HBV SURFACE AG SER QL: NONREACTIVE
HCT VFR BLD CALC: 49.7 %
HCV AB SER QL: NONREACTIVE
HCV S/CO RATIO: 0.13 S/CO
HDLC SERPL-MCNC: 66 MG/DL
HEPATITIS A IGG ANTIBODY: NONREACTIVE
HGB BLD-MCNC: 15.6 G/DL
HYALINE CASTS: 0 /LPF
IMM GRANULOCYTES NFR BLD AUTO: 0.2 %
IRON SATN MFR SERPL: 27 %
IRON SERPL-MCNC: 95 UG/DL
KETONES URINE: NEGATIVE
LDLC SERPL CALC-MCNC: 118 MG/DL
LEUKOCYTE ESTERASE URINE: NEGATIVE
LYMPHOCYTES # BLD AUTO: 5.69 K/UL
LYMPHOCYTES NFR BLD AUTO: 49.5 %
MAN DIFF?: NORMAL
MCHC RBC-ENTMCNC: 31.3 PG
MCHC RBC-ENTMCNC: 31.4 GM/DL
MCV RBC AUTO: 99.6 FL
MICROSCOPIC-UA: NORMAL
MONOCYTES # BLD AUTO: 0.67 K/UL
MONOCYTES NFR BLD AUTO: 5.8 %
NEUTROPHILS # BLD AUTO: 4.51 K/UL
NEUTROPHILS NFR BLD AUTO: 39.3 %
NITRITE URINE: NEGATIVE
NONHDLC SERPL-MCNC: 139 MG/DL
PH URINE: 6
PLATELET # BLD AUTO: 274 K/UL
POTASSIUM SERPL-SCNC: 5.4 MMOL/L
PROT SERPL-MCNC: 7.8 G/DL
PROTEIN URINE: NEGATIVE
PSA SERPL-MCNC: 2.79 NG/ML
RBC # BLD: 4.99 M/UL
RBC # FLD: 12.9 %
RED BLOOD CELLS URINE: 1 /HPF
SODIUM SERPL-SCNC: 138 MMOL/L
SPECIFIC GRAVITY URINE: 1.03
SQUAMOUS EPITHELIAL CELLS: 0 /HPF
TIBC SERPL-MCNC: 345 UG/DL
TRIGL SERPL-MCNC: 103 MG/DL
TSH SERPL-ACNC: 2.04 UIU/ML
UIBC SERPL-MCNC: 251 UG/DL
UROBILINOGEN URINE: NORMAL
WBC # FLD AUTO: 11.49 K/UL
WHITE BLOOD CELLS URINE: 0 /HPF

## 2022-04-25 PROCEDURE — 71046 X-RAY EXAM CHEST 2 VIEWS: CPT

## 2022-04-25 PROCEDURE — 99214 OFFICE O/P EST MOD 30 MIN: CPT | Mod: CS,25

## 2022-04-25 PROCEDURE — 95012 NITRIC OXIDE EXP GAS DETER: CPT

## 2022-04-25 PROCEDURE — 94010 BREATHING CAPACITY TEST: CPT

## 2022-04-25 NOTE — ADDENDUM
[FreeTextEntry1] : Documented by Daniela Park acting as a scribe for Dr. Anthony Hooker on 04/25/2022 \par \par All medical record entries made by the Scribe were at my, Dr. Anthony Hooker's, direction and personally dictated by me on 04/25/2022 . I have reviewed the chart and agree that the record accurately reflects my personal performance of the history, physical exam, assessment and plan. I have also personally directed, reviewed, and agree with the discharge instructions

## 2022-04-25 NOTE — HISTORY OF PRESENT ILLNESS
[TextBox_4] : Mr. VLILA is a 67 year old male presenting to the office today for pulmonary follow up. His chief complaint is\par \par -he notes increased coughing onset more than one month \par -he notes chest originates in chest \par -he notes intermittent phlegm production but has difficulty expelling \par -he notes cough is worse mostly later in the day \par -he notes outside more frequently\par -he denies coughing disturbs sleep \par -he notes swimming, biking and running for exercise \par -he notes needing to take deep breaths before exercise \par -he denies exercise exacerbates coughing \par -he notes intermittent dysphonia\par -he notes increased active heartburn that causes chest pain\par -he denies globus sensation \par -he notes sinuses are currently stable\par -he notes lower back pain \par -he notes tight hamstrings \par \par -denies any visual issues, headaches, nausea, vomiting, fever, chills, sweats, chest pressure, diarrhea, constipation, dysphagia, dizziness, leg swelling, leg pain, itchy eyes, itchy ears, reflux, or sour taste in the mouth.

## 2022-04-25 NOTE — PROCEDURE
[FreeTextEntry1] : PFT revealed normal flows, with a FEV1 of 3.19L,which is 93% of predicted with a flat inspiratory limb\par \par FENO was 140 ; a normal value being less than 25\par Fractional exhaled nitric oxide (FENO) is regarded as a simple, noninvasive method for assessing eosinophilic airway inflammation. Produced by a variety of cells within the lung, nitric oxide (NO) concentrations are generally low in healthy individuals. However, high concentrations of NO appear to be involved in nonspecific host defense mechanisms and chronic inflammatory diseases such as asthma. The American Thoracic Society (ATS) therefore has recommended using FENO to aid in the diagnosis and monitoring of eosinophilic airway inflammation and asthma, and for identifying steroid responsive individuals whose chronic respiratory symptoms may be caused by airway inflammation. \par \par CXR reveals a normal sized heart; no evidence of infiltrate or effusion--a normal appearing chest radiograph

## 2022-04-25 NOTE — ASSESSMENT
[FreeTextEntry1] : Mr. VILLA  is a 67 year old male with a history of  HTN, cholesterol, BPH,likely suspected COVID-19 infection who now comes to the office for a pulmonary evaluation for suspected COVID with pedestrian symptoms (resolved); still snoring ([place])- mild reduced stamina- cough c.w astma \par \par \par problem 1: s/p suspected COVID-19 Infection (s/p MAB)\par - recommended to quarantine for 10 days \par -s/p Pfizer COVID 19 vaccine x 2\par -s/p COVID 19 Antibody screening (+) \par -educated patient on COVID 19 vaccine and appropriate recommendations \par Immune Support Recommendations:\par -OTC Vitamin C 1000mg BID \par -OTC Quercetin 1000mg BID \par -OTC Zinc 50-100mg per day \par -OTC Melatonin 5mg a night \par -OTC Vitamin D 2000mg per day  \par - recommended hydration \par - recommended Baby Aspirin 30\par \par Problem 1A: RADS- active \par -add Symbicort 160 2 inhalations BID \par -continue Ventolin 2 puffs Q6H, pre-exercise\par Asthma is believed to be caused by inherited (genetic) and environmental factor, but its exact cause is unknown. Asthma may be triggered by allergens, lung infections, or irritants in the air. Asthma triggers are different for each person \par -Inhaler technique reviewed as well as oral hygiene techniques reviewed with patient. Avoidance of cold air, extremes of temperature, rescue inhaler should be used before exercise. Order of medication reviewed with patient. Recommended use of a cool mist humidifier in the bedroom. \par \par problem 1B: poor breathing mechanics\par -recommended Wim Hof and Buteyko breathing techniques \par -recommended Ed Geraldo\par -Proper breathing techniques were reviewed with an emphasis of exhalation. Patient instructed to breath in for 1 second and out for four seconds. Patient was encouraged to not talk while walking. \par \par problem 2: allergies / sinus\par  -continue Flonase 1 sniff/nostril BID\par - Environmental measures for allergies were encouraged including mattress and pillow cover, air purifier, and environmental controls.\par \par Problem 3: GERD\par -Add Pepcid 40 mg QHS \par -Rule of 2s: avoid eating too much, eating too late, eating too spicy, eating two hours before bed.\par -Things to avoid including overeating, spicy foods, tight clothing, eating within three hours of bed, this list is not all inclusive. \par -For treatment of reflux, possible options discussed including diet control, H2 blockers, PPIs, as well as coating motility agents discussed as treatment options. Timing of meals and proximity of last meal to sleep were discussed. If symptoms persist, a formal gastrointestinal evaluation is needed. \par \par Problem 4: primary snoring/ ?RESHMA\par - recommended positional sleep \par -complete home sleep study ([place] due to insurance conflict) \par -recommended Slumber Bump\par -recommended Somnifix \par -recommended OxyAid\par Good sleep hygiene was encouraged including wearing sunglasses 30 minutes before bed, avoiding watching television an hour before bed, keeping caffeine at a low, avoiding reading, television, or anything, in bed, no drinking any liquids three hours before bedtime, and only getting into bed when tired and ready for sleep. \par \par problem 5: health maintenance\par -recommended yearly flu shot after October 15 (completed 2021) \par -recommended strep pneumonia vaccines: Prevnar-13 vaccine, followed by Pneumo vaccine 23 one year following after 65 years old. \par -recommended early intervention for Upper Respiratory Infections (URIs)\par -recommended regular osteoporosis evaluations\par -recommended early dermatological evaluations\par -recommended after the age of 50 to the age of 70, colonoscopy every 5 years\par \par F/U in 6-8 weeks.\par He is encouraged to call with any changes, concerns, or questions\par \par

## 2022-04-25 NOTE — COUNSELING
[Inadequate social support] : Inadequate social support [Financial issues] : Financial issues [Needs reinforcement, provided] : Patient needs reinforcement on understanding of lifestyle changes and steps needed to achieve self management goal; reinforcement was provided [de-identified] : insurance conflict to perform sleep study

## 2022-05-24 ENCOUNTER — NON-APPOINTMENT (OUTPATIENT)
Age: 67
End: 2022-05-24

## 2022-05-24 ENCOUNTER — APPOINTMENT (OUTPATIENT)
Dept: CARDIOLOGY | Facility: CLINIC | Age: 67
End: 2022-05-24
Payer: MEDICARE

## 2022-05-24 VITALS
SYSTOLIC BLOOD PRESSURE: 164 MMHG | OXYGEN SATURATION: 99 % | HEIGHT: 70 IN | BODY MASS INDEX: 22.9 KG/M2 | DIASTOLIC BLOOD PRESSURE: 73 MMHG | HEART RATE: 54 BPM | WEIGHT: 160 LBS

## 2022-05-24 DIAGNOSIS — I34.0 NONRHEUMATIC MITRAL (VALVE) INSUFFICIENCY: ICD-10-CM

## 2022-05-24 DIAGNOSIS — I45.10 UNSPECIFIED RIGHT BUNDLE-BRANCH BLOCK: ICD-10-CM

## 2022-05-24 PROCEDURE — 99204 OFFICE O/P NEW MOD 45 MIN: CPT

## 2022-05-24 PROCEDURE — 93000 ELECTROCARDIOGRAM COMPLETE: CPT

## 2022-05-24 NOTE — HISTORY OF PRESENT ILLNESS
[FreeTextEntry1] : Malik is a 67-year-old gentleman HLD, HTN who presents to [facility] care. He was recently diagnosed as having GERD and started no PPI. He had an exercise stress test but concerned that his chest pain is related to CAD. Father had CABG at this age and paternal grandfather and uncle  same age from CAD.

## 2022-05-24 NOTE — DISCUSSION/SUMMARY
[FreeTextEntry1] : The patient is a 67-year-old gentleman +FH, HTN, HLD, MR, GERD with atypical chest pain and abnormal ECG.\par #1 CV- prior stress reviewed, cardiac ct ordered\par #2 Htn- c/w losartan\par #3 Lipids- c/w atorvastatin, consider increase pending CT results\par #4 MR- no change in exam\par #5 GERD- on PPI,may be cause of symptoms\par #6 General- may continue running

## 2022-05-24 NOTE — PHYSICAL EXAM
[Well Developed] : well developed [Well Nourished] : well nourished [No Acute Distress] : no acute distress [Normal Conjunctiva] : normal conjunctiva [Normal Venous Pressure] : normal venous pressure [No Carotid Bruit] : no carotid bruit [No Murmur] : no murmur [No Rub] : no rub [No Gallop] : no gallop [Murmur] : murmur [Clear Lung Fields] : clear lung fields [Good Air Entry] : good air entry [No Respiratory Distress] : no respiratory distress  [Soft] : abdomen soft [Non Tender] : non-tender [No Masses/organomegaly] : no masses/organomegaly [Normal Bowel Sounds] : normal bowel sounds [Normal Gait] : normal gait [No Edema] : no edema [No Cyanosis] : no cyanosis [No Clubbing] : no clubbing [No Varicosities] : no varicosities [No Rash] : no rash [No Skin Lesions] : no skin lesions [Moves all extremities] : moves all extremities [No Focal Deficits] : no focal deficits [Normal Speech] : normal speech [Alert and Oriented] : alert and oriented [Normal memory] : normal memory [de-identified] : 1/6 systolic

## 2022-05-24 NOTE — REVIEW OF SYSTEMS
[SOB] : no shortness of breath [Dyspnea on exertion] : not dyspnea during exertion [Chest Discomfort] : chest discomfort [Lower Ext Edema] : no extremity edema [Palpitations] : no palpitations [Negative] : Heme/Lymph

## 2022-05-26 ENCOUNTER — TRANSCRIPTION ENCOUNTER (OUTPATIENT)
Age: 67
End: 2022-05-26

## 2022-06-24 ENCOUNTER — OUTPATIENT (OUTPATIENT)
Dept: OUTPATIENT SERVICES | Facility: HOSPITAL | Age: 67
LOS: 1 days | End: 2022-06-24
Payer: MEDICARE

## 2022-06-24 ENCOUNTER — APPOINTMENT (OUTPATIENT)
Dept: CT IMAGING | Facility: CLINIC | Age: 67
End: 2022-06-24
Payer: MEDICARE

## 2022-06-24 DIAGNOSIS — I45.10 UNSPECIFIED RIGHT BUNDLE-BRANCH BLOCK: ICD-10-CM

## 2022-06-24 DIAGNOSIS — R07.89 OTHER CHEST PAIN: ICD-10-CM

## 2022-06-24 PROCEDURE — 75574 CT ANGIO HRT W/3D IMAGE: CPT | Mod: ME

## 2022-06-24 PROCEDURE — G1004: CPT

## 2022-06-24 PROCEDURE — 75574 CT ANGIO HRT W/3D IMAGE: CPT | Mod: 26,ME

## 2022-06-27 ENCOUNTER — TRANSCRIPTION ENCOUNTER (OUTPATIENT)
Age: 67
End: 2022-06-27

## 2022-06-27 RX ORDER — ASPIRIN 81 MG/1
81 TABLET ORAL DAILY
Refills: 0 | Status: ACTIVE | COMMUNITY
Start: 2022-06-27

## 2022-06-28 ENCOUNTER — OUTPATIENT (OUTPATIENT)
Dept: OUTPATIENT SERVICES | Facility: HOSPITAL | Age: 67
LOS: 1 days | End: 2022-06-28
Payer: MEDICARE

## 2022-06-28 DIAGNOSIS — Z11.52 ENCOUNTER FOR SCREENING FOR COVID-19: ICD-10-CM

## 2022-06-28 LAB
CHOLEST SERPL-MCNC: 166 MG/DL
HDLC SERPL-MCNC: 61 MG/DL
LDLC SERPL CALC-MCNC: 91 MG/DL
NONHDLC SERPL-MCNC: 105 MG/DL
SARS-COV-2 RNA SPEC QL NAA+PROBE: SIGNIFICANT CHANGE UP
TRIGL SERPL-MCNC: 70 MG/DL

## 2022-06-28 PROCEDURE — U0003: CPT

## 2022-06-28 PROCEDURE — U0005: CPT

## 2022-06-28 PROCEDURE — C9803: CPT

## 2022-07-08 ENCOUNTER — RX RENEWAL (OUTPATIENT)
Age: 67
End: 2022-07-08

## 2022-07-09 RX ORDER — ROSUVASTATIN CALCIUM 20 MG/1
20 TABLET, FILM COATED ORAL
Qty: 90 | Refills: 3 | Status: DISCONTINUED | COMMUNITY
Start: 2022-04-21 | End: 2022-07-09

## 2022-07-22 ENCOUNTER — TRANSCRIPTION ENCOUNTER (OUTPATIENT)
Age: 67
End: 2022-07-22

## 2022-07-26 DIAGNOSIS — N52.9 MALE ERECTILE DYSFUNCTION, UNSPECIFIED: ICD-10-CM

## 2022-07-28 ENCOUNTER — TRANSCRIPTION ENCOUNTER (OUTPATIENT)
Age: 67
End: 2022-07-28

## 2022-08-19 ENCOUNTER — TRANSCRIPTION ENCOUNTER (OUTPATIENT)
Age: 67
End: 2022-08-19

## 2022-08-29 ENCOUNTER — APPOINTMENT (OUTPATIENT)
Dept: PULMONOLOGY | Facility: CLINIC | Age: 67
End: 2022-08-29

## 2022-08-29 ENCOUNTER — NON-APPOINTMENT (OUTPATIENT)
Age: 67
End: 2022-08-29

## 2022-08-29 VITALS
WEIGHT: 160 LBS | OXYGEN SATURATION: 99 % | BODY MASS INDEX: 22.9 KG/M2 | HEIGHT: 70 IN | TEMPERATURE: 97 F | RESPIRATION RATE: 17 BRPM | DIASTOLIC BLOOD PRESSURE: 70 MMHG | SYSTOLIC BLOOD PRESSURE: 120 MMHG | HEART RATE: 62 BPM

## 2022-08-29 PROCEDURE — 99214 OFFICE O/P EST MOD 30 MIN: CPT | Mod: CS,25

## 2022-08-29 PROCEDURE — 94010 BREATHING CAPACITY TEST: CPT

## 2022-08-29 PROCEDURE — 95012 NITRIC OXIDE EXP GAS DETER: CPT

## 2022-08-29 NOTE — ASSESSMENT
[FreeTextEntry1] : Mr. VILLA  is a 67 year old male with a history of CAD s/p stent 7/2022 HTN, cholesterol, BPH,likely suspected COVID-19 infection who now comes to the office for a pulmonary evaluation for suspected COVID with pedestrian symptoms (resolved); still snoring ([place])- frustrated post cardiac stent \par \par \par problem 1: s/p suspected COVID-19 Infection (s/p MAB)\par - recommended to quarantine for 10 days \par -s/p Pfizer COVID 19 vaccine x 2\par -s/p COVID 19 Antibody screening (+) \par -educated patient on COVID 19 vaccine and appropriate recommendations \par Immune Support Recommendations:\par -OTC Vitamin C 1000mg BID \par -OTC Quercetin 1000mg BID \par -OTC Zinc 50-100mg per day \par -OTC Melatonin 5mg a night \par -OTC Vitamin D 2000mg per day  \par - recommended hydration \par - recommended Baby Aspirin 30\par \par Problem 1A: RADS- controlled \par -add Symbicort 160 2 inhalations BID \par -continue Ventolin 2 puffs Q6H, pre-exercise\par Asthma is believed to be caused by inherited (genetic) and environmental factor, but its exact cause is unknown. Asthma may be triggered by allergens, lung infections, or irritants in the air. Asthma triggers are different for each person \par -Inhaler technique reviewed as well as oral hygiene techniques reviewed with patient. Avoidance of cold air, extremes of temperature, rescue inhaler should be used before exercise. Order of medication reviewed with patient. Recommended use of a cool mist humidifier in the bedroom. \par \par Problem 1B CAD s/p Stent\par -continue evaluation with Jaylene 7/2022\par \par problem 1C: poor breathing mechanics\par -recommended Wim Hof and Buteyko breathing techniques \par -Proper breathing techniques were reviewed with an emphasis of exhalation. Patient instructed to breath in for 1 second and out for four seconds. Patient was encouraged to not talk while walking. \par \par \par problem 2: allergies / sinus\par  -continue Flonase 1 sniff/nostril BID\par - Environmental measures for allergies were encouraged including mattress and pillow cover, air purifier, and environmental controls.\par \par Problem 3: GERD\par -Add Pepcid 40 mg QHS \par -Rule of 2s: avoid eating too much, eating too late, eating too spicy, eating two hours before bed.\par -Things to avoid including overeating, spicy foods, tight clothing, eating within three hours of bed, this list is not all inclusive. \par -For treatment of reflux, possible options discussed including diet control, H2 blockers, PPIs, as well as coating motility agents discussed as treatment options. Timing of meals and proximity of last meal to sleep were discussed. If symptoms persist, a formal gastrointestinal evaluation is needed. \par \par Problem 4: primary snoring/ ?RESHMA\par - recommended positional sleep \par -complete home sleep study ([place] due to insurance conflict)- now medicare \par -recommended Slumber Bump\par -recommended Somnifix \par -recommended OxyAid\par Good sleep hygiene was encouraged including wearing sunglasses 30 minutes before bed, avoiding watching television an hour before bed, keeping caffeine at a low, avoiding reading, television, or anything, in bed, no drinking any liquids three hours before bedtime, and only getting into bed when tired and ready for sleep. \par \par problem 5: health maintenance\par -recommended yearly flu shot after October 15 (completed 2021) \par -recommended strep pneumonia vaccines: Prevnar-13 vaccine 4/2022, followed by Pneumo vaccine 23 one year following after 65 years old. \par -recommended early intervention for Upper Respiratory Infections (URIs)\par -recommended regular osteoporosis evaluations\par -recommended early dermatological evaluations\par -recommended after the age of 50 to the age of 70, colonoscopy every 5 years\par \par F/U in 6-8 weeks.\par He is encouraged to call with any changes, concerns, or questions\par \par

## 2022-08-29 NOTE — PROCEDURE
[FreeTextEntry1] : PFT reveals normal flows, with an FEV1 of 3.00L, which is 87% of predicted, with a flattened inspiratory limb.\par \par FENO was 7; normal value being less than 25\par Fractional exhaled nitric oxide (FENO) is regarded as a simple, noninvasive method for assessing eosinophilic airway inflammation. Produced by a variety of cells within the lung, nitric oxide (NO) concentrations are generally low in healthy individuals. However, high concentrations of NO appear to be involved in nonspecific host defense mechanisms and chronic inflammatory diseases such as asthma. The American Thoracic Society (ATS) therefore has recommended using FENO to aid in the diagnosis and monitoring of eosinophilic airway inflammation and asthma, and for identifying steroid responsive individuals whose chronic respiratory symptoms may be airway inflammation.

## 2022-08-29 NOTE — ADDENDUM
[FreeTextEntry1] : Documented by Daniela Park acting as a scribe for Dr. Anthony Hooker on 08/29/2022 \par \par All medical record entries made by the Scribe were at my, Dr. Anthony Hooker's, direction and personally dictated by me on 08/29/2022 . I have reviewed the chart and agree that the record accurately reflects my personal performance of the history, physical exam, assessment and plan. I have also personally directed, reviewed, and agree with the discharge instructions

## 2022-08-29 NOTE — COUNSELING
[Inadequate social support] : Inadequate social support [Financial issues] : Financial issues [Needs reinforcement, provided] : Patient needs reinforcement on understanding of lifestyle changes and steps needed to achieve self management goal; reinforcement was provided [de-identified] : insurance conflict to perform sleep study

## 2022-08-29 NOTE — HISTORY OF PRESENT ILLNESS
[TextBox_4] : Mr. VILLA is a 67 year old male presenting to the office today for pulmonary follow up. His chief complaint is\par \par -he notes s/p coronary CT that found calcifications \par -he notes s/p angiogram at [facility] \par -he notes s/p stent placement 7/2022\par -he notes anxiety over current cardiac issues \par -he notes intermittent chest pressure \par -he notes getting enough sleep of about 8 hrs\par -he denies coughing\par -he denies wheezing \par -he denies SOB but overall fitness has decreased from last year (triathlon time has slowed down)\par -he denies restful sleep \par -he notes snoring\par -he notes diet is stable \par \par -He denies any visual issues, headaches, nausea, vomiting, fever, chills, sweats, chest pains, diarrhea, constipation, dysphagia, myalgia, dizziness, leg swelling, leg pain, itchy eyes, itchy ears, heartburn, reflux, or sour taste in the mouth.

## 2022-09-13 ENCOUNTER — APPOINTMENT (OUTPATIENT)
Dept: INTERNAL MEDICINE | Facility: CLINIC | Age: 67
End: 2022-09-13

## 2022-09-13 VITALS
SYSTOLIC BLOOD PRESSURE: 120 MMHG | DIASTOLIC BLOOD PRESSURE: 70 MMHG | WEIGHT: 160 LBS | BODY MASS INDEX: 22.9 KG/M2 | HEART RATE: 70 BPM | OXYGEN SATURATION: 98 % | HEIGHT: 70 IN

## 2022-09-13 VITALS — DIASTOLIC BLOOD PRESSURE: 76 MMHG | SYSTOLIC BLOOD PRESSURE: 120 MMHG

## 2022-09-13 PROCEDURE — 99214 OFFICE O/P EST MOD 30 MIN: CPT | Mod: 25

## 2022-09-13 PROCEDURE — G0008: CPT

## 2022-09-13 PROCEDURE — 90686 IIV4 VACC NO PRSV 0.5 ML IM: CPT

## 2022-09-18 NOTE — HISTORY OF PRESENT ILLNESS
[de-identified] : Underwent cardiac catheterization at [facility] on July 1, 2022.  Cardiac cath revealed an EF of 55% with 70% LAD lesion treated with 1 ALEJANDRO.  Circumflex had diffuse 60% disease.  RCA had nonobstructive ASHD patient placed on DAPT with statin therapy to be continued.  Patient reports he never had chest pain but just felt slightly more fatigued than usual when doing his usual exercise and running.  Patient feels well, fatigue is no longer an issue and has resumed his normal activity including  running and training for his marathons.\par Strong family history of coronary artery disease notable in his father.  He plans to follow-up with a cardiologist after the cath most likely at [facility].

## 2022-09-24 LAB
ALBUMIN SERPL ELPH-MCNC: 4.8 G/DL
ALP BLD-CCNC: 52 U/L
ALT SERPL-CCNC: 45 U/L
ANION GAP SERPL CALC-SCNC: 14 MMOL/L
AST SERPL-CCNC: 37 U/L
BASOPHILS # BLD AUTO: 0.04 K/UL
BASOPHILS NFR BLD AUTO: 0.4 %
BILIRUB SERPL-MCNC: 0.5 MG/DL
BUN SERPL-MCNC: 25 MG/DL
CALCIUM SERPL-MCNC: 10 MG/DL
CHLORIDE SERPL-SCNC: 104 MMOL/L
CHOLEST SERPL-MCNC: 157 MG/DL
CO2 SERPL-SCNC: 24 MMOL/L
CREAT SERPL-MCNC: 1.2 MG/DL
EGFR: 67 ML/MIN/1.73M2
EOSINOPHIL # BLD AUTO: 0.25 K/UL
EOSINOPHIL NFR BLD AUTO: 2.6 %
ESTIMATED AVERAGE GLUCOSE: 123 MG/DL
FRUCTOSAMINE SERPL-MCNC: 263 UMOL/L
GLUCOSE SERPL-MCNC: 102 MG/DL
HBA1C MFR BLD HPLC: 5.9 %
HCT VFR BLD CALC: 42.9 %
HDLC SERPL-MCNC: 61 MG/DL
HGB BLD-MCNC: 14.1 G/DL
IMM GRANULOCYTES NFR BLD AUTO: 0.3 %
LDLC SERPL CALC-MCNC: 79 MG/DL
LYMPHOCYTES # BLD AUTO: 4.43 K/UL
LYMPHOCYTES NFR BLD AUTO: 45.5 %
MAN DIFF?: NORMAL
MCHC RBC-ENTMCNC: 32.4 PG
MCHC RBC-ENTMCNC: 32.9 GM/DL
MCV RBC AUTO: 98.6 FL
MONOCYTES # BLD AUTO: 0.55 K/UL
MONOCYTES NFR BLD AUTO: 5.7 %
NEUTROPHILS # BLD AUTO: 4.43 K/UL
NEUTROPHILS NFR BLD AUTO: 45.5 %
NONHDLC SERPL-MCNC: 96 MG/DL
PLATELET # BLD AUTO: 233 K/UL
POTASSIUM SERPL-SCNC: 4.4 MMOL/L
PROT SERPL-MCNC: 7.3 G/DL
RBC # BLD: 4.35 M/UL
RBC # FLD: 12.6 %
SODIUM SERPL-SCNC: 141 MMOL/L
TRIGL SERPL-MCNC: 81 MG/DL
WBC # FLD AUTO: 9.73 K/UL

## 2022-09-26 ENCOUNTER — APPOINTMENT (OUTPATIENT)
Dept: PULMONOLOGY | Facility: CLINIC | Age: 67
End: 2022-09-26

## 2022-09-26 PROCEDURE — 99213 OFFICE O/P EST LOW 20 MIN: CPT | Mod: 95

## 2022-09-26 NOTE — HISTORY OF PRESENT ILLNESS
[TextBox_4] : Mr. VILLA is a 67 year old male with a history of CAD s/p stent 7/2022 HTN, cholesterol, BPH who is in to discuss recent sleep study results. \par \par Pt completed a HST due to reports of snoring and occasional unrefreshing sleep.\par No other complaints, doing well otherwise.\par Has not seen a dentist in years.

## 2022-09-26 NOTE — ASSESSMENT
[FreeTextEntry1] : Mr. VILLA is a 67 year old male with a history of CAD s/p stent 7/2022 HTN, cholesterol, BPH who is in to discuss recent sleep study results.\par \par #1.Borderline vs mild RESHMA- minimal symptoms. AHI was 6.2/hr night 1, then 2.9/hr night 2 (less data/less efficient study on night 2) \par -Consequences of untreated sleep apnea could include- various heart conditions, hypertension, diabetes, chronic inflammation, memory issues, stroke, obesity, decreased libido, sleep related accidents, as well as anxiety and depression.\par -We went through different treatment options for him:\par 1.Continuous positive airway pressure therapy (CPAP) uses a machine to help a person who has obstructive sleep apnea (RESHMA) breathe more easily during sleep. A CPAP machine increases air pressure in your throat so that your airway doesn't collapse when you breathe in. Pt is NOT interested in this. \par 2.Oral appliances are safe, noninvasive and highly effective for treating snoring and varying severities of obstructive sleep apnea. The oral devices are designed to position the lower jaw slightly forward and down. This opens the airway. This option is not as suitable with those that have moderate to severe sleep apnea as well as central or complex sleep apnea. This may be a good option for the patient but needs to establish dental care first to ensure procedures are done prior to taking impressions of his teeth. He will think about this. \par 3.Weight loss: treating sleep apnea, like treating many diseases, starts with lifestyle and behavioral modifications. For most RESHMA sufferers, this includes working toward a healthy body weight. Weight loss reduces fatty deposits in the neck and tongue which can contribute to restricted airflow. This also reduces abdominal fat, which in turn increases lung volume and improves airway traction, making the airway less likely to collapse during sleep.  Weight loss of just 10-15% can reduce the severity of RESHMA by 50% in moderately obese patients. Unfortunately, while weight loss can provide meaningful improvements in RESHMA, it usually does not lead to a complete cure, and many sleep apnea patients need additional therapies. Pt does not have weight to lose. \par \par #2.Snoring\par -consider snore guard. \par \par Pt will keep us posted, visit with dentist first to establish care, then monitor his sleep symptoms. \par Pt to keep follow up with Dr. Hooker as scheduled.

## 2022-09-26 NOTE — PHYSICAL EXAM
[No Acute Distress] : no acute distress [Well Nourished] : well nourished [No Deformities] : no deformities [Well Developed] : well developed [Normal Appearance] : normal appearance [No Resp Distress] : no resp distress [Oriented x3] : oriented x3 [Normal Mood] : normal mood [Normal Affect] : normal affect [Remote Memory Normal] : remote memory normal

## 2022-09-26 NOTE — REASON FOR VISIT
[Home] : at home, [unfilled] , at the time of the visit. [Medical Office: ([facility])___] : at the medical office located in  [Follow-Up] : a follow-up visit [Sleep Evaluation] : sleep evaluation [Sleep Apnea] : sleep apnea

## 2022-10-14 ENCOUNTER — RX RENEWAL (OUTPATIENT)
Age: 67
End: 2022-10-14

## 2022-12-13 ENCOUNTER — APPOINTMENT (OUTPATIENT)
Dept: INTERNAL MEDICINE | Facility: CLINIC | Age: 67
End: 2022-12-13

## 2023-01-03 ENCOUNTER — NON-APPOINTMENT (OUTPATIENT)
Age: 68
End: 2023-01-03

## 2023-01-03 ENCOUNTER — TRANSCRIPTION ENCOUNTER (OUTPATIENT)
Age: 68
End: 2023-01-03

## 2023-01-03 ENCOUNTER — APPOINTMENT (OUTPATIENT)
Dept: PULMONOLOGY | Facility: CLINIC | Age: 68
End: 2023-01-03
Payer: MEDICARE

## 2023-01-03 VITALS
BODY MASS INDEX: 22.9 KG/M2 | WEIGHT: 160 LBS | HEART RATE: 71 BPM | SYSTOLIC BLOOD PRESSURE: 120 MMHG | OXYGEN SATURATION: 97 % | HEIGHT: 70 IN | TEMPERATURE: 97.8 F | DIASTOLIC BLOOD PRESSURE: 70 MMHG | RESPIRATION RATE: 17 BRPM

## 2023-01-03 PROCEDURE — 94010 BREATHING CAPACITY TEST: CPT

## 2023-01-03 PROCEDURE — 99214 OFFICE O/P EST MOD 30 MIN: CPT | Mod: CS,25

## 2023-01-03 PROCEDURE — 95012 NITRIC OXIDE EXP GAS DETER: CPT

## 2023-01-03 NOTE — PROCEDURE
[FreeTextEntry1] : Sleep study (9/12/2022) revealed mild sleep apnea with an AHI/KRISH of 6.2and a low oxygen saturation of 88%\par \par Feno was 148; a normal value being less than 25. Fractional exhaled nitric oxide (FENO) is regarded as a simple, noninvasive method for assessing eosinophilic airway inflammation. Produced by a variety of cells within the lung, nitric oxide (NO) concentrations are generally low in healthy individuals. However, high concentrations of NO appear to be involved in nonspecific host defense mechanisms and chronic inflammatory  diseases such as asthma. The American Thoracic Society (ATS) therefore recommended using FENO to aid in the diagnosis and monitoring of eosinophilic airway inflammation and asthma, and for identifying steroid responsive individuals whose chronic respiratory symptoms may be caused by airway inflammation \par \par PFT revealed normal flows, with a FEV1 of 2.91L, which is 85% of predicted, with a flattened inspiratory limb

## 2023-01-03 NOTE — REASON FOR VISIT
[Follow-Up] : a follow-up visit [TextBox_44] :  suspected likely COVID-19 infection, primary snoring, SOB, PND, RESHMA

## 2023-01-03 NOTE — ASSESSMENT
[FreeTextEntry1] : Mr. VILLA  is a 67 year old male with a history of CAD s/p stent 7/2022 HTN, cholesterol, BPH,likely suspected COVID-19 infection who now comes to the office for a pulmonary evaluation for suspected COVID with pedestrian symptoms (resolved); still snoring (NC)- s/p cardiac stent  6/2022\par \par \par problem 1: s/p suspected COVID-19 Infection (s/p MAB) -resolved\par - recommended to quarantine for 10 days \par -s/p Pfizer COVID 19 vaccine x 2\par -s/p COVID 19 Antibody screening (+) \par -educated patient on COVID 19 vaccine and appropriate recommendations \par Immune Support Recommendations:\par -OTC Vitamin C 1000mg BID \par -OTC Quercetin 1000mg BID \par -OTC Zinc 50-100mg per day \par -OTC Melatonin 5mg a night \par -OTC Vitamin D 2000mg per day  \par - recommended hydration \par - recommended Baby Aspirin 30\par \par Problem 1A: RADS- controlled \par -add Symbicort 160 2 inhalations BID \par -continue Ventolin 2 puffs Q6H, pre-exercise\par Asthma is believed to be caused by inherited (genetic) and environmental factor, but its exact cause is unknown. Asthma may be triggered by allergens, lung infections, or irritants in the air. Asthma triggers are different for each person \par -Inhaler technique reviewed as well as oral hygiene techniques reviewed with patient. Avoidance of cold air, extremes of temperature, rescue inhaler should be used before exercise. Order of medication reviewed with patient. Recommended use of a cool mist humidifier in the bedroom. \par \par Problem 1B CAD s/p Stent\par -continue evaluation with Jaylene 7/2022\par \par problem 1C: poor breathing mechanics\par -recommended Wim Hof and Buteyko breathing techniques \par -Proper breathing techniques were reviewed with an emphasis of exhalation. Patient instructed to breath in for 1 second and out for four seconds. Patient was encouraged to not talk while walking. \par \par \par problem 2: allergies / sinus\par  -continue Flonase 1 sniff/nostril BID\par - Environmental measures for allergies were encouraged including mattress and pillow cover, air purifier, and environmental controls.\par \par Problem 3: GERD\par -Add Pepcid 40 mg QHS \par -Rule of 2s: avoid eating too much, eating too late, eating too spicy, eating two hours before bed.\par -Things to avoid including overeating, spicy foods, tight clothing, eating within three hours of bed, this list is not all inclusive. \par -For treatment of reflux, possible options discussed including diet control, H2 blockers, PPIs, as well as coating motility agents discussed as treatment options. Timing of meals and proximity of last meal to sleep were discussed. If symptoms persist, a formal gastrointestinal evaluation is needed. \par \par Problem 4: primary snoring/ +RESHMA (mild)\par - recommended positional sleep \par -complete home sleep study (NC due to insurance conflict)- s/p medicare - dental device/Excite/[place] Pro\par -recommended Slumber Bump\par -recommended Somnifix \par -recommended OxyAid\par Good sleep hygiene was encouraged including wearing sunglasses 30 minutes before bed, avoiding watching television an hour before bed, keeping caffeine at a low, avoiding reading, television, or anything, in bed, no drinking any liquids three hours before bedtime, and only getting into bed when tired and ready for sleep. \par \par problem 5: health maintenance\par -add Mitovive\par -recommended yearly flu shot after October 15 (completed 2021) \par -recommended strep pneumonia vaccines: Prevnar-13 vaccine 4/2022, followed by Pneumo vaccine 23 one year following after 65 years old. \par -recommended early intervention for Upper Respiratory Infections (URIs)\par -recommended regular osteoporosis evaluations\par -recommended early dermatological evaluations\par -recommended after the age of 50 to the age of 70, colonoscopy every 5 years\par \par F/U in 6-8 weeks.\par He is encouraged to call with any changes, concerns, or questions\par \par

## 2023-01-03 NOTE — ADDENDUM
[FreeTextEntry1] : Documented by Suhail Lopez acting as a scribe for Dr. Anthony Hooker on 01/03/2023.\par \par All medical record entries made by the Scribe were at my, Dr. Anthony Hooker's, direction and personally dictated by me on 01/03/2023. I have reviewed the chart and agree that the record accurately reflects my personal performance of the history, physical exam, assessment and plan. I have also personally directed, reviewed, and agree with the discharge instructions.

## 2023-01-03 NOTE — HISTORY OF PRESENT ILLNESS
[TextBox_4] : Mr. VILLA is a 67 year old male presenting to the office today for pulmonary follow up. His chief complaint is\par \par -he notes feeling generally well\par -he notes his energy levels are lower\par -he notes feeling slower than usual, thinks it is age-related\par -he notes exercising 5 days per week\par -he notes chest tightness as times\par -he notes his weight is stable\par -he notes getting 8 hours of sleep \par -he notes knee pain at times (s/p knee surgery)\par -he notes snoring\par -he notes that he has a cracked tooth\par \par \par \par -patient denies any headaches, nausea, vomiting, fever, chills, sweats, chest pain, palpitations, coughing, wheezing, diarrhea, constipation, dysphagia, myalgias, dizziness, leg swelling, leg pain, itchy eyes, itchy ears, heartburn, reflux or sour taste in the mouth

## 2023-01-07 ENCOUNTER — RX RENEWAL (OUTPATIENT)
Age: 68
End: 2023-01-07

## 2023-01-10 ENCOUNTER — APPOINTMENT (OUTPATIENT)
Dept: INTERNAL MEDICINE | Facility: CLINIC | Age: 68
End: 2023-01-10

## 2023-02-28 ENCOUNTER — APPOINTMENT (OUTPATIENT)
Dept: PULMONOLOGY | Facility: CLINIC | Age: 68
End: 2023-02-28
Payer: MEDICARE

## 2023-02-28 PROCEDURE — 99214 OFFICE O/P EST MOD 30 MIN: CPT | Mod: 95

## 2023-02-28 NOTE — PHYSICAL EXAM
[No Acute Distress] : no acute distress [No Neck Mass] : no neck mass [No Resp Distress] : no resp distress [Normal Color/ Pigmentation] : normal color/ pigmentation [No Focal Deficits] : no focal deficits [Oriented x3] : oriented x3 [Normal Affect] : normal affect [Well Nourished] : well nourished [Normal Appearance] : normal appearance [Well Groomed] : well groomed [Well Developed] : well developed [Normal Mood] : normal mood [Remote Memory Normal] : remote memory normal

## 2023-02-28 NOTE — REVIEW OF SYSTEMS
[Negative] : Endocrine [Nasal Congestion] : nasal congestion [Postnasal Drip] : postnasal drip [Sinus Problems] : sinus problems [Cough] : cough [Chest Tightness] : chest tightness [Wheezing] : wheezing [Fever] : no fever [Fatigue] : no fatigue [Chills] : no chills [Poor Appetite] : no poor appetite [Dry Eyes] : no dry eyes [Ear Disturbance] : no ear disturbance [Sore Throat] : no sore throat [Sputum] : no sputum [Chest Discomfort] : no chest discomfort [Edema] : no edema [Palpitations] : no palpitations [GERD] : no gerd [Abdominal Pain] : no abdominal pain [Nausea] : no nausea [Vomiting] : no vomiting [Diarrhea] : no diarrhea [Constipation] : no constipation [Nocturia] : no nocturia [Frequency] : no dysuria [Urgency] : no frequency [Headache] : no headache [Dizziness] : no dizziness [Numbness] : no numbness [Confusion] : no confusion [Depression] : no depression [Anxiety] : no anxiety

## 2023-02-28 NOTE — HISTORY OF PRESENT ILLNESS
[Home] : at home, [unfilled] , at the time of the visit. [Medical Office: ([facility])___] : at the medical office located in  [Verbal consent obtained from patient] : the patient, [unfilled] [TextBox_4] : VISIT 1/3/23: Mr. VILLA is a 67 year old male presenting to the office today for pulmonary follow up. His chief complaint is\par \par -he notes feeling generally well\par -he notes his energy levels are lower\par -he notes feeling slower than usual, thinks it is age-related\par -he notes exercising 5 days per week\par -he notes chest tightness as times\par -he notes his weight is stable\par -he notes getting 8 hours of sleep \par -he notes knee pain at times (s/p knee surgery)\par -he notes snoring\par -he notes that he has a cracked tooth\par \par -patient denies any headaches, nausea, vomiting, fever, chills, sweats, chest pain, palpitations, coughing, wheezing, diarrhea, constipation, dysphagia, myalgias, dizziness, leg swelling, leg pain, itchy eyes, itchy ears, heartburn, reflux or sour taste in the mouth \par \par TELEHEALTH VISIT TODAY 2/28/23:\par  Mr. Villa is a 68 year old male with past medical history including reactive airway disease, hypertension, hyperlipidemia, GERD and erectile dysfunction. He presents to the office today with a persistent cough for the past month. \par \par His cough started a month ago before he went on a trip to [place]. While in [place], he noticed that his cough was getting worse and a couple of nights he he felt like he heard "gurgling" in his chest. His cough is non-productive but feels like he has mucus in his chest that cannot come up. He is coughing during conversation and at night as well. He feels when he s exercising he doesn't feel as much air in his lungs and gets breathier than usual, at rest he feels okay- no issues with SOB. He also complains of sinus congestion, PND, intermittent wheezing, and chest tightness. He denies fever, chills, fatigue, sore throat, ear pain, or reflux.\par \par He denies ever taking steroids for his reactive airway disease. He bought OTC guaifenesin to help bring up phlegm. He is taking his Symbicort 160 2 inhalations BID on most days. He has been using his ventolin inhaler here and there but is unsure if it is helping him. He is not taking pepcid 40mg at night for his GERD symptoms. Currently not on any nasal sprays or antihistamines.

## 2023-02-28 NOTE — ASSESSMENT
[FreeTextEntry1] : Mr. Mart is a 68 year old male with past medical history including reactive airway disease, hypertension, hyperlipidemia, GERD and erectile dysfunction. He presents to the office today with a persistent cough and breathiness for the past month. He appears to be having a mild exacerbation related to his reactive airway disease complicated by post nasal drip, sinus congestion and undertreated reflux.\par \par Problem 1: Reactive airway disease with mild exacerbation (prior FeNO was 148)\par -add Singulair 10 mg PO QHS\par -Continue Symbicort 160 2 inhalations BID, rinse and gargle after use \par -Continue Ventolin 2 puffs Q6H PRN\par -Patient instructed to take ventolin inhaler 5-7 minutes before using symbicort inhaler for 1 week\par -If condition doesn't improve after 1 week, consider steroid taper\par -Inhaler technique reviewed as well as oral hygiene techniques reviewed with patient. Avoidance of cold air, extremes of temperature, rescue inhaler should be used before exercise. Order of medication reviewed with patient. Recommended use of a cool mist humidifier in the bedroom. \par \par problem 2: Post Nasal Drip and sinus congestion, hx of allergic rhinitis\par - Add Flonase 1 sniff/nostril BID, instructed to use everyday to get full effect from medication\par - Add Azelasteine 137mcg/spray, 2 sprays in each nostril BID\par - add singulair 10 mg as above\par \par Problem 3: Acute Cough and chest congestion\par - For comfort- Add Benzonatate 200mg, 1 capsule three times daily PRN\par - Instructed to take benzonatate prior to activities that could trigger cough such as exercise and talking for extended period of time\par - ok to continue mucinex for the next few days to help with chest congestion- hydrate with this. \par \par Problem 4: GERD\par -Add Pepcid 40 mg QHS, as patient reports he was not taking \par -Rule of 2s: avoid eating too much, eating too late, eating too spicy, eating two hours before bed.\par -Things to avoid including overeating, spicy foods, tight clothing, eating within three hours of bed, this list is not all inclusive. \par \par F/U via myhealth in 1 week to give update if condition is improving. If condition is worsening, he is instructed to call the office sooner. If worsening or no improvement in the next week- add CXR. \par He is encouraged to call with any changes, concerns, or questions.\par

## 2023-04-07 ENCOUNTER — APPOINTMENT (OUTPATIENT)
Dept: PULMONOLOGY | Facility: CLINIC | Age: 68
End: 2023-04-07
Payer: MEDICARE

## 2023-04-07 ENCOUNTER — LABORATORY RESULT (OUTPATIENT)
Age: 68
End: 2023-04-07

## 2023-04-07 ENCOUNTER — RX RENEWAL (OUTPATIENT)
Age: 68
End: 2023-04-07

## 2023-04-07 ENCOUNTER — NON-APPOINTMENT (OUTPATIENT)
Age: 68
End: 2023-04-07

## 2023-04-07 VITALS
SYSTOLIC BLOOD PRESSURE: 120 MMHG | TEMPERATURE: 97.6 F | DIASTOLIC BLOOD PRESSURE: 65 MMHG | RESPIRATION RATE: 16 BRPM | HEIGHT: 70 IN | OXYGEN SATURATION: 97 % | HEART RATE: 76 BPM | WEIGHT: 160 LBS | BODY MASS INDEX: 22.9 KG/M2

## 2023-04-07 DIAGNOSIS — J45.901 UNSPECIFIED ASTHMA WITH (ACUTE) EXACERBATION: ICD-10-CM

## 2023-04-07 PROCEDURE — 99214 OFFICE O/P EST MOD 30 MIN: CPT | Mod: 25

## 2023-04-07 PROCEDURE — 94729 DIFFUSING CAPACITY: CPT

## 2023-04-07 PROCEDURE — 94010 BREATHING CAPACITY TEST: CPT

## 2023-04-07 PROCEDURE — 71046 X-RAY EXAM CHEST 2 VIEWS: CPT | Mod: 26

## 2023-04-07 PROCEDURE — 94727 GAS DIL/WSHOT DETER LNG VOL: CPT

## 2023-04-07 PROCEDURE — 95012 NITRIC OXIDE EXP GAS DETER: CPT

## 2023-04-07 NOTE — PHYSICAL EXAM
[No Acute Distress] : no acute distress [Well Nourished] : well nourished [No Deformities] : no deformities [Normal Appearance] : normal appearance [No Neck Mass] : no neck mass [Normal Rate/Rhythm] : normal rate/rhythm [Normal S1, S2] : normal s1, s2 [No Murmurs] : no murmurs [No Resp Distress] : no resp distress [Wheeze] : wheeze [No Abnormalities] : no abnormalities [Normal Gait] : normal gait [No Clubbing] : no clubbing [No Cyanosis] : no cyanosis [No Edema] : no edema [FROM] : FROM [Normal Color/ Pigmentation] : normal color/ pigmentation [No Focal Deficits] : no focal deficits [Oriented x3] : oriented x3 [Normal Affect] : normal affect [TextBox_68] : Bilateral diffuse inspiratory and expiratory wheezing auscultated.

## 2023-04-07 NOTE — PROCEDURE
[FreeTextEntry1] : CXR in office; Read by Dr. Hooker. \par Impression: Normal appearing heart. No infiltrates, effusion or opacities noted.\par \par spi performed in office show \par FEV1:  62% \par FEV1/FVC Ratio: 96% \par [id number]%: 58% \par \par Feno: > 300 ppb; significant elevation, which has continued to trend up. \par Reviewed feno level 8/2022 of 7 ppb & 1/2023 of 148 ppb.

## 2023-04-07 NOTE — ASSESSMENT
[FreeTextEntry1] : Mr. Mart is a 68 year old male with past medical history including reactive airway disease, hypertension, hyperlipidemia, GERD and erectile dysfunction. He presents to the office today with a persistent cough and breathiness for the past month. He appears to be having a mild exacerbation related to his reactive airway disease complicated by post nasal drip, sinus congestion and undertreated reflux.\par \par 1. Reactive airway disease with exacerbation (prior FeNO was >300 ppb):\par - Add Prednisone: Take 30 mg x 5 days, 20 mg x 5 days, and 10 mg x 5 days.\par - Continue Ventolin 2 puffs Q6H PRN\par - Continue Symbicort 160 2 inhalations BID, rinse and gargle after use \par - Continue Singulair 10 mg PO QHS\par \par 2. Post Nasal Drip and sinus congestion, hx of allergic rhinitis\par - Continue Flonase 1 sniff/nostril BID, instructed to use everyday to get full effect from medication\par - Cotninue Azelasteine 137mcg/spray, 2 sprays in each nostril BID\par - add OTC Antihistamine.\par - RX for Asthma/Food Mix profiles, IgE, and CBC w/ Diff RX'ed. \par \par 3. GERD:\par - Continue Pepcid 40 mg QHS, as patient reports he was not taking \par - Rule of 2s: avoid eating too much, eating too late, eating too spicy, eating two hours before bed.\par -Things to avoid including overeating, spicy foods, tight clothing, eating within three hours of bed, this list is not all inclusive. \par \par 4. Cough:\par - likely r/t RADs flare.\par - CXR in office WNL.\par - If cough does not improve in 1 week patient to inform office for Chest CT.\par \par Patient to follow up with Dr. Hooker as scheduled.\par Patient to call with further questions and concerns.\par Patient verbalizes understanding of care plan and is agreeable.\par \par \par

## 2023-04-07 NOTE — HISTORY OF PRESENT ILLNESS
[Never] : never [TextBox_4] :  Mr. Mart is a 68 year old male with past medical history including reactive airway disease, hypertension, hyperlipidemia, GERD and erectile dysfunction. He presents to the office today with a persistent cough x 2 months. \par \par TELEHEALTH VISIT  2/28/23:\par \par His cough started a month ago before he went on a trip to [place]. While in [place], he noticed that his cough was getting worse and a couple of nights he he felt like he heard "gurgling" in his chest. His cough is non-productive but feels like he has mucus in his chest that cannot come up. He is coughing during conversation and at night as well. He feels when he s exercising he doesn't feel as much air in his lungs and gets breathier than usual, at rest he feels okay- no issues with SOB. He also complains of sinus congestion, PND, intermittent wheezing, and chest tightness. He denies fever, chills, fatigue, sore throat, ear pain, or reflux.\par \par He denies ever taking steroids for his reactive airway disease. He bought OTC guaifenesin to help bring up phlegm. He is taking his Symbicort 160 2 inhalations BID on most days. He has been using his ventolin inhaler here and there but is unsure if it is helping him. He is not taking pepcid 40mg at night for his GERD symptoms. Currently not on any nasal sprays or antihistamines. \par \par 4/7/23 UPDATE:\par \par Patient states he has been compliant on Symbicort, montelukast, reflux medication.\par He states that symptom of cough has worsened over the last few months.\par He states he has been wheezing, particularly at night.\par He last used albuterol couple of days ago, and is unsure if he felt benefit.\par He also admits to shortness of breath on exertion.  Patient notes he is an avid runner and never had to stop during his run prior to recent months.\par \par He denies fever/chills, decreased appetite, shortness of breath at rest, or increased fatigue.\par

## 2023-04-11 LAB
A ALTERNATA IGE QN: <0.1 KUA/L
A FUMIGATUS IGE QN: <0.1 KUA/L
BASOPHILS # BLD AUTO: 0.07 K/UL
BASOPHILS NFR BLD AUTO: 0.8 %
C ALBICANS IGE QN: <0.1 KUA/L
C HERBARUM IGE QN: <0.1 KUA/L
CAT DANDER IGE QN: <0.1 KUA/L
CLAM IGE QN: <0.1 KUA/L
CODFISH IGE QN: <0.1 KUA/L
COMMON RAGWEED IGE QN: <0.1 KUA/L
CORN IGE QN: <0.1 KUA/L
COW MILK IGE QN: <0.1 KUA/L
D FARINAE IGE QN: <0.1 KUA/L
D PTERONYSS IGE QN: <0.1 KUA/L
DEPRECATED A ALTERNATA IGE RAST QL: 0
DEPRECATED A FUMIGATUS IGE RAST QL: 0
DEPRECATED C ALBICANS IGE RAST QL: 0
DEPRECATED C HERBARUM IGE RAST QL: 0
DEPRECATED CAT DANDER IGE RAST QL: 0
DEPRECATED CLAM IGE RAST QL: 0
DEPRECATED CODFISH IGE RAST QL: 0
DEPRECATED COMMON RAGWEED IGE RAST QL: 0
DEPRECATED CORN IGE RAST QL: 0
DEPRECATED COW MILK IGE RAST QL: 0
DEPRECATED D FARINAE IGE RAST QL: 0
DEPRECATED D PTERONYSS IGE RAST QL: 0
DEPRECATED DOG DANDER IGE RAST QL: 0
DEPRECATED EGG WHITE IGE RAST QL: NORMAL
DEPRECATED M RACEMOSUS IGE RAST QL: 0
DEPRECATED PEANUT IGE RAST QL: 0
DEPRECATED ROACH IGE RAST QL: 0
DEPRECATED SCALLOP IGE RAST QL: <0.1 KUA/L
DEPRECATED SESAME SEED IGE RAST QL: 0
DEPRECATED SHRIMP IGE RAST QL: 0
DEPRECATED SOYBEAN IGE RAST QL: 0
DEPRECATED TIMOTHY IGE RAST QL: 0
DEPRECATED WALNUT IGE RAST QL: 0
DEPRECATED WHEAT IGE RAST QL: 0
DEPRECATED WHITE OAK IGE RAST QL: 0
DOG DANDER IGE QN: <0.1 KUA/L
EGG WHITE IGE QN: 0.16 KUA/L
EOSINOPHIL # BLD AUTO: 1.32 K/UL
EOSINOPHIL NFR BLD AUTO: 14.2 %
HCT VFR BLD CALC: 45.2 %
HGB BLD-MCNC: 14.9 G/DL
IMM GRANULOCYTES NFR BLD AUTO: 0.1 %
LYMPHOCYTES # BLD AUTO: 5.1 K/UL
LYMPHOCYTES NFR BLD AUTO: 55 %
M RACEMOSUS IGE QN: <0.1 KUA/L
MAN DIFF?: NORMAL
MCHC RBC-ENTMCNC: 32.3 PG
MCHC RBC-ENTMCNC: 33 GM/DL
MCV RBC AUTO: 98 FL
MONOCYTES # BLD AUTO: 0.58 K/UL
MONOCYTES NFR BLD AUTO: 6.3 %
NEUTROPHILS # BLD AUTO: 2.2 K/UL
NEUTROPHILS NFR BLD AUTO: 23.6 %
PEANUT IGE QN: <0.1 KUA/L
PLATELET # BLD AUTO: 238 K/UL
RBC # BLD: 4.61 M/UL
RBC # FLD: 12.6 %
ROACH IGE QN: <0.1 KUA/L
SCALLOP IGE QN: 0
SCALLOP IGE QN: <0.1 KUA/L
SESAME SEED IGE QN: <0.1 KUA/L
SOYBEAN IGE QN: <0.1 KUA/L
TIMOTHY IGE QN: <0.1 KUA/L
TOTAL IGE SMQN RAST: 232 KU/L
WALNUT IGE QN: <0.1 KUA/L
WBC # FLD AUTO: 9.28 K/UL
WHEAT IGE QN: <0.1 KUA/L
WHITE OAK IGE QN: <0.1 KUA/L

## 2023-04-13 ENCOUNTER — NON-APPOINTMENT (OUTPATIENT)
Age: 68
End: 2023-04-13

## 2023-04-13 DIAGNOSIS — R89.9 UNSPECIFIED ABNORMAL FINDING IN SPECIMENS FROM OTHER ORGANS, SYSTEMS AND TISSUES: ICD-10-CM

## 2023-04-24 ENCOUNTER — LABORATORY RESULT (OUTPATIENT)
Age: 68
End: 2023-04-24

## 2023-04-24 ENCOUNTER — APPOINTMENT (OUTPATIENT)
Dept: INTERNAL MEDICINE | Facility: CLINIC | Age: 68
End: 2023-04-24
Payer: MEDICARE

## 2023-04-24 VITALS
WEIGHT: 160 LBS | SYSTOLIC BLOOD PRESSURE: 140 MMHG | BODY MASS INDEX: 22.9 KG/M2 | DIASTOLIC BLOOD PRESSURE: 74 MMHG | OXYGEN SATURATION: 98 % | HEART RATE: 74 BPM | HEIGHT: 70 IN

## 2023-04-24 DIAGNOSIS — Z12.11 ENCOUNTER FOR SCREENING FOR MALIGNANT NEOPLASM OF COLON: ICD-10-CM

## 2023-04-24 DIAGNOSIS — Z00.00 ENCOUNTER FOR GENERAL ADULT MEDICAL EXAMINATION W/OUT ABNORMAL FINDINGS: ICD-10-CM

## 2023-04-24 DIAGNOSIS — Z12.83 ENCOUNTER FOR SCREENING FOR MALIGNANT NEOPLASM OF SKIN: ICD-10-CM

## 2023-04-24 PROCEDURE — G0444 DEPRESSION SCREEN ANNUAL: CPT

## 2023-04-24 PROCEDURE — G0439: CPT

## 2023-04-24 NOTE — PHYSICAL EXAM
[No Acute Distress] : no acute distress [Well Nourished] : well nourished [Well Developed] : well developed [Well-Appearing] : well-appearing [Normal Sclera/Conjunctiva] : normal sclera/conjunctiva [PERRL] : pupils equal round and reactive to light [EOMI] : extraocular movements intact [Normal Outer Ear/Nose] : the outer ears and nose were normal in appearance [Normal Oropharynx] : the oropharynx was normal [No JVD] : no jugular venous distention [Supple] : supple [No Lymphadenopathy] : no lymphadenopathy [Thyroid Normal, No Nodules] : the thyroid was normal and there were no nodules present [No Respiratory Distress] : no respiratory distress  [No Accessory Muscle Use] : no accessory muscle use [Clear to Auscultation] : lungs were clear to auscultation bilaterally [Normal Rate] : normal rate  [Regular Rhythm] : with a regular rhythm [Normal S1, S2] : normal S1 and S2 [No Murmur] : no murmur heard [No Carotid Bruits] : no carotid bruits [No Abdominal Bruit] : a ~M bruit was not heard ~T in the abdomen [No Varicosities] : no varicosities [Pedal Pulses Present] : the pedal pulses are present [No Edema] : there was no peripheral edema [No Palpable Aorta] : no palpable aorta [No Extremity Clubbing/Cyanosis] : no extremity clubbing/cyanosis [Normal Appearance] : normal in appearance [No Axillary Lymphadenopathy] : no axillary lymphadenopathy [Soft] : abdomen soft [Non Tender] : non-tender [Non-distended] : non-distended [No Masses] : no abdominal mass palpated [No HSM] : no HSM [Normal Bowel Sounds] : normal bowel sounds [No CVA Tenderness] : no CVA  tenderness [No Spinal Tenderness] : no spinal tenderness [No Joint Swelling] : no joint swelling [Grossly Normal Strength/Tone] : grossly normal strength/tone [No Rash] : no rash [Coordination Grossly Intact] : coordination grossly intact [No Focal Deficits] : no focal deficits [Normal Gait] : normal gait [Deep Tendon Reflexes (DTR)] : deep tendon reflexes were 2+ and symmetric [Normal Affect] : the affect was normal [Normal Insight/Judgement] : insight and judgment were intact [Normal] : affect was normal and insight and judgment were intact

## 2023-05-01 ENCOUNTER — LABORATORY RESULT (OUTPATIENT)
Age: 68
End: 2023-05-01

## 2023-05-01 ENCOUNTER — APPOINTMENT (OUTPATIENT)
Dept: PULMONOLOGY | Facility: CLINIC | Age: 68
End: 2023-05-01
Payer: MEDICARE

## 2023-05-01 VITALS
BODY MASS INDEX: 22.9 KG/M2 | WEIGHT: 160 LBS | RESPIRATION RATE: 16 BRPM | DIASTOLIC BLOOD PRESSURE: 70 MMHG | TEMPERATURE: 97.2 F | HEIGHT: 70 IN | OXYGEN SATURATION: 97 % | SYSTOLIC BLOOD PRESSURE: 140 MMHG | HEART RATE: 68 BPM

## 2023-05-01 DIAGNOSIS — R09.82 POSTNASAL DRIP: ICD-10-CM

## 2023-05-01 PROCEDURE — ZZZZZ: CPT

## 2023-05-01 PROCEDURE — 99214 OFFICE O/P EST MOD 30 MIN: CPT | Mod: CS,25

## 2023-05-01 PROCEDURE — 95012 NITRIC OXIDE EXP GAS DETER: CPT

## 2023-05-01 PROCEDURE — 94727 GAS DIL/WSHOT DETER LNG VOL: CPT

## 2023-05-01 PROCEDURE — 94729 DIFFUSING CAPACITY: CPT

## 2023-05-01 PROCEDURE — 94010 BREATHING CAPACITY TEST: CPT

## 2023-05-01 NOTE — HISTORY OF PRESENT ILLNESS
[TextBox_4] : Mr. VILLA is a 68 year old male presenting to the office today for pulmonary follow up. His chief complaint is\par - s/p prednisone course \par - he notes feeling better \par - he denies difficulty swallowing\par - he notes bowels are regular \par - he notes exercising\par - he notes he will be doing marathon next month \par - he notes energy level could be better \par - he denies getting coughing fits anymore  \par \par - He  denies any headaches, nausea, vomiting, fever, chills, sweats, chest pains, chest pressure, diarrhea, constipation, dysphagia, myalgia, dizziness, leg swelling, leg pain, itchy eyes, itchy ears, heartburn, reflux, or sour taste in the mouth.

## 2023-05-01 NOTE — REASON FOR VISIT
[Follow-Up] : a follow-up visit [TextBox_44] :  suspected likely COVID-19 infection, primary snoring, SOB, RADS likely Asthma, PND, RESHMA
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
## 2023-05-01 NOTE — PHYSICAL EXAM
[No Acute Distress] : no acute distress [Normal Oropharynx] : normal oropharynx [No Neck Mass] : no neck mass [Normal Appearance] : normal appearance [Normal Rate/Rhythm] : normal rate/rhythm [Normal S1, S2] : normal s1, s2 [No Murmurs] : no murmurs [No Resp Distress] : no resp distress [Clear to Auscultation Bilaterally] : clear to auscultation bilaterally [No Abnormalities] : no abnormalities [Benign] : benign [Normal Gait] : normal gait [No Clubbing] : no clubbing [No Cyanosis] : no cyanosis [No Edema] : no edema [FROM] : FROM [Normal Color/ Pigmentation] : normal color/ pigmentation [No Focal Deficits] : no focal deficits [Oriented x3] : oriented x3 [Normal Affect] : normal affect [III] : Mallampati Class: III [TextBox_68] : I:E ratio 1:3; clear

## 2023-05-01 NOTE — REVIEW OF SYSTEMS
[Fatigue] : fatigue [Heartburn] : heartburn [Back Pain] : back pain [Negative] : Heme/Lymph [Skin Rash] : no skin rash [FreeTextEntry5] : atypical chest pain after eating-more likely GERD

## 2023-05-01 NOTE — ADDENDUM
[FreeTextEntry1] : Documented by Qamar Adams acting as a scribe for Dr. Anthony Hooker on (05/01/2023).\par \par All medical record entries made by the Scribe were at my, Dr. Anthony Hooker's, direction and personally dictated by me on (05/01/2023). I have reviewed the chart and agree that the record accurately reflects my personal performance of the history, physical exam, assessment and plan. I have personally directed, reviewed, and agree with the discharge instructions.

## 2023-05-01 NOTE — HEALTH RISK ASSESSMENT
[4 or more  times a week (4 pts)] : 4 or more  times a week (4 points) [Yes] : Yes [1 or 2 (0 pts)] : 1 or 2 (0 points) [No falls in past year] : Patient reported no falls in the past year [Never (0 pts)] : Never (0 points) [0] : 2) Feeling down, depressed, or hopeless: Not at all (0) [PHQ-2 Negative - No further assessment needed] : PHQ-2 Negative - No further assessment needed [de-identified] : cardiology Dr Haywood [de-identified] : 2 glasses of wine with dinner [Audit-CScore] : 4 [[id number]] : 0 [None] : None [Change in mental status noted] : No change in mental status noted [Employed] : employed [Graduate School] : graduate school [Feels Safe at Home] : Feels safe at home [Fully functional (bathing, dressing, toileting, transferring, walking, feeding)] : Fully functional (bathing, dressing, toileting, transferring, walking, feeding) [Fully functional (using the telephone, shopping, preparing meals, housekeeping, doing laundry, using] : Fully functional and needs no help or supervision to perform IADLs (using the telephone, shopping, preparing meals, housekeeping, doing laundry, using transportation, managing medications and managing finances) [Reports changes in hearing] : Reports no changes in hearing [Reports changes in vision] : Reports no changes in vision [Reports normal functional visual acuity (ie: able to read med bottle)] : Reports normal functional visual acuity [ColonoscopyComments] : declined in past-given referral april 2023 [HepatitisCDate] : 4/22 [HIVDate] : 4/12 [FreeTextEntry2] :

## 2023-05-01 NOTE — PROCEDURE
[FreeTextEntry1] : PFT's were performed for the evaluation of Asthma/SOB\par \par Full PFT- spi reveals mild obstruction; FEV1 was 2.85L which is 84% of predicted; normal lung volumes; normal diffusion at 28.0, which is 136% of predicted; normal flow volume loop \par \par FENO was 123; a normal value being less than 25\par Fractional exhaled nitric oxide (FENO) is regarded as a simple, noninvasive method for assessing eosinophilic airway inflammation. Produced by a variety of cells within the lung, nitric oxide (NO) concentrations are generally low in healthy individuals. However, high concentrations of NO appear to be involved in nonspecific host defense mechanisms and chronic inflammatory diseases such as asthma. The American Thoracic Society (ATS) therefore has recommended using FENO to aid in the diagnosis and monitoring of eosinophilic airway inflammation and asthma, and for identifying steroid responsive individuals whose chronic respiratory symptoms may be caused by airway inflammation.
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
## 2023-05-03 LAB
A FUMIGATUS IGE QN: <0.1 KUA/L
ALTERN TENCAPG(M6): 3.2 MCG/ML
ASPER FUMCAPG(M3): 79.2 MCG/ML
AUREOBASCAPG(M12): 16 MCG/ML
DEPRECATED A FUMIGATUS IGE RAST QL: 0
LACEYELLA SACCHARI IGG: 3.2 MCG/ML
MICROPOLYCAPG(M22): <2 MCG/ML
PENIC CHRYCAPG(M1): 73.2 MCG/ML
PHOMA BETAE IGG: 6 MCG/ML
STRONGYLOIDES AB SER IA-ACNC: NEGATIVE
TRICHODERMA VIRIDE IGG: 4.7 MCG/ML

## 2023-05-04 ENCOUNTER — TRANSCRIPTION ENCOUNTER (OUTPATIENT)
Age: 68
End: 2023-05-04

## 2023-05-04 ENCOUNTER — NON-APPOINTMENT (OUTPATIENT)
Age: 68
End: 2023-05-04

## 2023-05-05 ENCOUNTER — APPOINTMENT (OUTPATIENT)
Dept: PULMONOLOGY | Facility: CLINIC | Age: 68
End: 2023-05-05
Payer: MEDICARE

## 2023-05-05 VITALS — BODY MASS INDEX: 22.96 KG/M2 | WEIGHT: 160 LBS

## 2023-05-05 PROCEDURE — 99214 OFFICE O/P EST MOD 30 MIN: CPT | Mod: 95

## 2023-05-05 RX ORDER — BUDESONIDE AND FORMOTEROL FUMARATE DIHYDRATE 160; 4.5 UG/1; UG/1
160-4.5 AEROSOL RESPIRATORY (INHALATION) TWICE DAILY
Qty: 30.6 | Refills: 1 | Status: DISCONTINUED | COMMUNITY
Start: 2022-04-25 | End: 2023-05-05
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
## 2023-05-05 NOTE — HISTORY OF PRESENT ILLNESS
[Home] : at home, [unfilled] , at the time of the visit. [Medical Office: ([facility])___] : at the medical office located in  [Verbal consent obtained from patient] : the patient, [unfilled] [TextBox_4] : Mr. VILLA is a 68 year old male presenting to the office today via video call for pulmonary follow up. His chief complaint is\par - he notes feeling well in general \par - he denies coughing anymore \par - called to know more/review his recent blood work results \par \par - He  denies any headaches, nausea, vomiting, fever, chills, sweats, chest pains, chest pressure, diarrhea, constipation, dysphagia, myalgia, dizziness, leg swelling, leg pain, itchy eyes, itchy ears, heartburn, reflux, or sour taste in the mouth.

## 2023-05-05 NOTE — ADDENDUM
[FreeTextEntry1] : Documented by Qamar Adams acting as a scribe for Dr. Anthony Hooker on (05/05/2023).\par \par All medical record entries made by the Scribe were at my, Dr. Anthony Hooker's, direction and personally dictated by me on (05/05/2023). I have reviewed the chart and agree that the record accurately reflects my personal performance of the history, physical exam, assessment and plan. I have personally directed, reviewed, and agree with the discharge instructions.

## 2023-05-05 NOTE — REASON FOR VISIT
[Follow-Up] : a follow-up visit [TextBox_44] : video call: suspected likely COVID-19 infection, primary snoring, SOB, RADS likely Asthma, PND, RESHMA

## 2023-05-06 LAB
A FLAVUS AB FLD QL: NEGATIVE
A FUMIGATUS AB FLD QL: NEGATIVE
A NIGER AB FLD QL: NEGATIVE

## 2023-05-08 ENCOUNTER — TRANSCRIPTION ENCOUNTER (OUTPATIENT)
Age: 68
End: 2023-05-08

## 2023-05-18 ENCOUNTER — TRANSCRIPTION ENCOUNTER (OUTPATIENT)
Age: 68
End: 2023-05-18

## 2023-05-18 RX ORDER — GLYCOPYRROLATE AND FORMOTEROL FUMARATE 9; 4.8 UG/1; UG/1
9-4.8 AEROSOL, METERED RESPIRATORY (INHALATION)
Qty: 3 | Refills: 1 | Status: DISCONTINUED | COMMUNITY
Start: 2023-05-01 | End: 2023-05-18

## 2023-05-22 ENCOUNTER — NON-APPOINTMENT (OUTPATIENT)
Age: 68
End: 2023-05-22

## 2023-05-22 ENCOUNTER — TRANSCRIPTION ENCOUNTER (OUTPATIENT)
Age: 68
End: 2023-05-22

## 2023-05-23 ENCOUNTER — TRANSCRIPTION ENCOUNTER (OUTPATIENT)
Age: 68
End: 2023-05-23

## 2023-06-09 LAB
ALBUMIN SERPL ELPH-MCNC: 4.3 G/DL
ALP BLD-CCNC: 47 U/L
ALT SERPL-CCNC: 36 U/L
ANION GAP SERPL CALC-SCNC: 16 MMOL/L
APPEARANCE: CLEAR
AST SERPL-CCNC: 32 U/L
BACTERIA: NEGATIVE /HPF
BASOPHILS # BLD AUTO: 0.12 K/UL
BASOPHILS NFR BLD AUTO: 0.9 %
BILIRUB SERPL-MCNC: 0.4 MG/DL
BILIRUBIN URINE: NEGATIVE
BLOOD URINE: NEGATIVE
BUN SERPL-MCNC: 38 MG/DL
CALCIUM SERPL-MCNC: 10.1 MG/DL
CAST: 1 /LPF
CHLORIDE SERPL-SCNC: 102 MMOL/L
CHOLEST SERPL-MCNC: 184 MG/DL
CO2 SERPL-SCNC: 24 MMOL/L
COLOR: YELLOW
CREAT SERPL-MCNC: 1.26 MG/DL
EGFR: 63 ML/MIN/1.73M2
EOSINOPHIL # BLD AUTO: 0.12 K/UL
EOSINOPHIL NFR BLD AUTO: 0.9 %
EPITHELIAL CELLS: 0 /HPF
ESTIMATED AVERAGE GLUCOSE: 126 MG/DL
GLUCOSE QUALITATIVE U: NEGATIVE MG/DL
GLUCOSE SERPL-MCNC: 73 MG/DL
HBA1C MFR BLD HPLC: 6 %
HCT VFR BLD CALC: 45.4 %
HDLC SERPL-MCNC: 80 MG/DL
HGB BLD-MCNC: 14.3 G/DL
KETONES URINE: ABNORMAL MG/DL
LDLC SERPL CALC-MCNC: 87 MG/DL
LDLC SERPL DIRECT ASSAY-MCNC: 88 MG/DL
LEUKOCYTE ESTERASE URINE: NEGATIVE
LYMPHOCYTES # BLD AUTO: 7.02 K/UL
LYMPHOCYTES NFR BLD AUTO: 53 %
MAN DIFF?: NORMAL
MCHC RBC-ENTMCNC: 31.5 GM/DL
MCHC RBC-ENTMCNC: 32.4 PG
MCV RBC AUTO: 102.9 FL
MICROSCOPIC-UA: NORMAL
MONOCYTES # BLD AUTO: 0.46 K/UL
MONOCYTES NFR BLD AUTO: 3.5 %
NEUTROPHILS # BLD AUTO: 4.83 K/UL
NEUTROPHILS NFR BLD AUTO: 36.5 %
NITRITE URINE: NEGATIVE
NONHDLC SERPL-MCNC: 105 MG/DL
PH URINE: 5.5
PLATELET # BLD AUTO: 260 K/UL
POTASSIUM SERPL-SCNC: 4.3 MMOL/L
PROT SERPL-MCNC: 7.2 G/DL
PROTEIN URINE: NORMAL MG/DL
PSA SERPL-MCNC: 3.02 NG/ML
RBC # BLD: 4.41 M/UL
RBC # FLD: 13.2 %
RED BLOOD CELLS URINE: 2 /HPF
SODIUM SERPL-SCNC: 142 MMOL/L
SPECIFIC GRAVITY URINE: 1.03
TRIGL SERPL-MCNC: 87 MG/DL
TSH SERPL-ACNC: 3.18 UIU/ML
UROBILINOGEN URINE: 0.2 MG/DL
WBC # FLD AUTO: 13.24 K/UL
WHITE BLOOD CELLS URINE: 0 /HPF

## 2023-06-11 ENCOUNTER — NON-APPOINTMENT (OUTPATIENT)
Age: 68
End: 2023-06-11

## 2023-06-11 DIAGNOSIS — S72.002A FRACTURE OF UNSPECIFIED PART OF NECK OF LEFT FEMUR, INITIAL ENCOUNTER FOR CLOSED FRACTURE: ICD-10-CM
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
## 2023-06-12 ENCOUNTER — NON-APPOINTMENT (OUTPATIENT)
Age: 68
End: 2023-06-12

## 2023-06-13 ENCOUNTER — NON-APPOINTMENT (OUTPATIENT)
Age: 68
End: 2023-06-13

## 2023-06-14 ENCOUNTER — NON-APPOINTMENT (OUTPATIENT)
Age: 68
End: 2023-06-14

## 2023-06-22 ENCOUNTER — APPOINTMENT (OUTPATIENT)
Dept: INTERNAL MEDICINE | Facility: CLINIC | Age: 68
End: 2023-06-22

## 2023-08-24 ENCOUNTER — OUTPATIENT (OUTPATIENT)
Dept: OUTPATIENT SERVICES | Facility: HOSPITAL | Age: 68
LOS: 1 days | End: 2023-08-24

## 2023-08-24 ENCOUNTER — APPOINTMENT (OUTPATIENT)
Dept: INTERNAL MEDICINE | Facility: CLINIC | Age: 68
End: 2023-08-24
Payer: MEDICARE

## 2023-08-24 ENCOUNTER — APPOINTMENT (OUTPATIENT)
Dept: PULMONOLOGY | Facility: CLINIC | Age: 68
End: 2023-08-24
Payer: MEDICARE

## 2023-08-24 DIAGNOSIS — R05.8 OTHER SPECIFIED COUGH: ICD-10-CM

## 2023-08-24 DIAGNOSIS — I10 ESSENTIAL (PRIMARY) HYPERTENSION: ICD-10-CM

## 2023-08-24 DIAGNOSIS — U07.1 COVID-19: ICD-10-CM

## 2023-08-24 PROCEDURE — 99214 OFFICE O/P EST MOD 30 MIN: CPT | Mod: 95

## 2023-08-24 PROCEDURE — 99213 OFFICE O/P EST LOW 20 MIN: CPT | Mod: 95

## 2023-08-24 RX ORDER — PRASUGREL 10 MG/1
10 TABLET, FILM COATED ORAL DAILY
Refills: 0 | Status: DISCONTINUED | COMMUNITY
Start: 2022-09-13 | End: 2023-08-24

## 2023-08-24 RX ORDER — BECLOMETHASONE DIPROPIONATE HFA 80 UG/1
80 AEROSOL, METERED RESPIRATORY (INHALATION) TWICE DAILY
Qty: 3 | Refills: 1 | Status: DISCONTINUED | COMMUNITY
Start: 2023-05-05 | End: 2023-08-24

## 2023-08-24 RX ORDER — SILDENAFIL 50 MG/1
50 TABLET ORAL
Qty: 10 | Refills: 3 | Status: DISCONTINUED | COMMUNITY
Start: 2022-07-26 | End: 2023-08-24

## 2023-08-24 RX ORDER — PREDNISONE 10 MG/1
10 TABLET ORAL
Qty: 31 | Refills: 0 | Status: DISCONTINUED | COMMUNITY
Start: 2023-04-07 | End: 2023-08-24

## 2023-08-24 RX ORDER — PREDNISONE 10 MG/1
10 TABLET ORAL
Qty: 50 | Refills: 0 | Status: DISCONTINUED | COMMUNITY
Start: 2023-05-01 | End: 2023-08-24

## 2023-08-24 NOTE — ASSESSMENT
[FreeTextEntry1] : 68M with CAD s/p PCI 2022, HTN, HLD, GERD, here as televisit after testing positive for COVID today.  #COVID-19 Pt COVID vaccinated x4, had covid in the past. Tested positive today with at home test. - saw pulm earlier today, Paxlovid, fluticasone, and Tessalon Perles sent to pharmacy - advised pt to hold his statin while taking Paxlovid. med rec today showing no other significant drug-drug interactions.  - educated pt on adequate hydration while with active infection and to isolate from rest of household contacts to best of ability - given strict ED precautions if he develops sudden onset SOB, CP, dizziness  Case discussed with Dr. Marce Sánchez MD PGY2
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
## 2023-08-24 NOTE — REVIEW OF SYSTEMS
[Fatigue] : fatigue [Cough] : cough [Dyspnea on Exertion] : dyspnea on exertion [Headache] : headache [Fever] : no fever [Chills] : no chills [Hot Flashes] : no hot flashes [Discharge] : no discharge [Vision Problems] : no vision problems [Earache] : no earache [Nasal Discharge] : no nasal discharge [Chest Pain] : no chest pain [Palpitations] : no palpitations [Orthopena] : no orthopnea [Paroxysmal Nocturnal Dyspnea] : no paroxysmal nocturnal dyspnea [Shortness Of Breath] : no shortness of breath [Wheezing] : no wheezing [Abdominal Pain] : no abdominal pain [Vomiting] : no vomiting [Dysuria] : no dysuria [Incontinence] : no incontinence [Hematuria] : no hematuria [Frequency] : no frequency [Joint Pain] : no joint pain [Back Pain] : no back pain [Suicidal] : not suicidal [Easy Bleeding] : no easy bleeding

## 2023-08-24 NOTE — PHYSICAL EXAM
[Normal] : no acute distress, well nourished, well developed and well-appearing [EOMI] : extraocular movements intact [No Respiratory Distress] : no respiratory distress  [No Accessory Muscle Use] : no accessory muscle use [Soft] : abdomen soft [Non Tender] : non-tender [No CVA Tenderness] : no CVA  tenderness [No Spinal Tenderness] : no spinal tenderness [No Joint Swelling] : no joint swelling

## 2023-08-29 PROBLEM — R05.8 DRY COUGH: Status: ACTIVE | Noted: 2023-02-28

## 2023-08-29 NOTE — REASON FOR VISIT
[Home] : at home, [unfilled] , at the time of the visit. [Medical Office: ([facility])___] : at the medical office located in  [Patient] : the patient [Follow-Up] : a follow-up visit [Asthma] : asthma [Cough] : cough

## 2023-08-29 NOTE — HISTORY OF PRESENT ILLNESS
[TextBox_4] : Mr. VILLA is a 68 year old male with a history of CAD s/p stent 7/2022 HTN, cholesterol, BPH  who now comes to the office via video for an acute visit.   His chief concern is COVID 19 infection.   Patient reports he tested positive for COVID 19 infection today. He states his symptoms started yesterday. Patient reports his symptoms are dry cough, headache, and nasal congestion. He states he has Symbicort inhaler at home but is not currently using it. Patient states he has Flonase nasal spray at home.   Patient denies fever, chills, SOB @ rest or exertion, wheezing, runny nose, PND, or heartburn/reflux.

## 2023-08-29 NOTE — ASSESSMENT
[FreeTextEntry1] : Mr. VILLA is a 68 year old male with a history of CAD s/p stent 7/2022 HTN, cholesterol, BPH  who now comes to the office via video for an acute visit.   1.COVID 19 infection -add Paxlovid BID X 5 days -Advised patient to quarantine for 5 days from symptom onset.  -recommended patient hold statins for 7 days.   2. RADS/Asthma -restart Ventolin 2 puffs Q6H, pre-exercise -restart Symbicort 160 2 inhalations BID: rinse & gargle after use  3. allergies / sinus  -restart Flonase 1 sniff/nostril BID  4.Dry cough -add benzonatate 200 mg TID PRN  Patient to follow up with Dr. Hooker as scheduled. Patient to call with further questions and concerns. Patient verbalizes understanding of care plan and is agreeable.

## 2023-08-29 NOTE — REVIEW OF SYSTEMS
[Nasal Congestion] : nasal congestion [Cough] : cough [Headache] : headache [Negative] : Psychiatric [Dry Eyes] : no dry eyes [Ear Disturbance] : no ear disturbance [Epistaxis] : no epistaxis [Sore Throat] : no sore throat [Eye Irritation] : no eye irritation [Postnasal Drip] : no postnasal drip [Dry Mouth] : no dry mouth [Sinus Problems] : no sinus problems [Mouth Ulcers] : no mouth ulcers [Poor Dentition] : no poor dentition [Edentulous] : no edentulous [Hemoptysis] : no hemoptysis [Chest Tightness] : no chest tightness [Frequent URIs] : no frequent URIs [Sputum] : no sputum [Dyspnea] : no dyspnea [Pleuritic Pain] : no pleuritic pain [Wheezing] : no wheezing [A.M. Dry Mouth] : no a.m. dry mouth [SOB on Exertion] : no sob on exertion [Focal Weakness] : no focal weakness [Seizures] : no seizures [Head Injury] : no head injury [Dizziness] : no dizziness [Numbness] : no numbness [Memory Loss] : no memory loss [Tremor] : no tremor [Paralysis] : no paralysis [Confusion] : no confusion [Involuntary Movements] : no involuntary movements

## 2023-09-11 ENCOUNTER — APPOINTMENT (OUTPATIENT)
Dept: PULMONOLOGY | Facility: CLINIC | Age: 68
End: 2023-09-11
Payer: MEDICARE

## 2023-09-11 VITALS
BODY MASS INDEX: 22.9 KG/M2 | RESPIRATION RATE: 16 BRPM | HEIGHT: 70 IN | DIASTOLIC BLOOD PRESSURE: 84 MMHG | HEART RATE: 84 BPM | WEIGHT: 160 LBS | OXYGEN SATURATION: 97 % | SYSTOLIC BLOOD PRESSURE: 142 MMHG | TEMPERATURE: 97.2 F

## 2023-09-11 DIAGNOSIS — U07.1 COVID-19: ICD-10-CM

## 2023-09-11 PROCEDURE — 94010 BREATHING CAPACITY TEST: CPT

## 2023-09-11 PROCEDURE — 99214 OFFICE O/P EST MOD 30 MIN: CPT | Mod: 25

## 2023-09-11 PROCEDURE — 95012 NITRIC OXIDE EXP GAS DETER: CPT

## 2023-09-18 ENCOUNTER — TRANSCRIPTION ENCOUNTER (OUTPATIENT)
Age: 68
End: 2023-09-18

## 2023-09-19 ENCOUNTER — TRANSCRIPTION ENCOUNTER (OUTPATIENT)
Age: 68
End: 2023-09-19

## 2023-10-16 ENCOUNTER — TRANSCRIPTION ENCOUNTER (OUTPATIENT)
Age: 68
End: 2023-10-16

## 2023-10-23 ENCOUNTER — RX RENEWAL (OUTPATIENT)
Age: 68
End: 2023-10-23

## 2023-10-23 RX ORDER — OMEPRAZOLE 40 MG/1
40 CAPSULE, DELAYED RELEASE ORAL
Qty: 90 | Refills: 0 | Status: ACTIVE | COMMUNITY
Start: 2022-04-21 | End: 1900-01-01

## 2023-11-22 ENCOUNTER — TRANSCRIPTION ENCOUNTER (OUTPATIENT)
Age: 68
End: 2023-11-22

## 2023-11-22 RX ORDER — LEVALBUTEROL HYDROCHLORIDE 0.63 MG/3ML
0.63 SOLUTION RESPIRATORY (INHALATION)
Qty: 120 | Refills: 2 | Status: ACTIVE | COMMUNITY
Start: 2023-11-22 | End: 1900-01-01

## 2023-11-22 RX ORDER — ALBUTEROL SULFATE 90 UG/1
108 (90 BASE) AEROSOL, METERED RESPIRATORY (INHALATION)
Qty: 1 | Refills: 2 | Status: ACTIVE | COMMUNITY
Start: 2021-07-15 | End: 1900-01-01

## 2023-11-22 RX ORDER — BLOOD-GLUCOSE METER
KIT MISCELLANEOUS
Qty: 1 | Refills: 0 | Status: ACTIVE | COMMUNITY
Start: 2023-11-22 | End: 1900-01-01

## 2023-12-08 ENCOUNTER — APPOINTMENT (OUTPATIENT)
Dept: INTERNAL MEDICINE | Facility: CLINIC | Age: 68
End: 2023-12-08
Payer: MEDICARE

## 2023-12-08 ENCOUNTER — NON-APPOINTMENT (OUTPATIENT)
Age: 68
End: 2023-12-08

## 2023-12-08 ENCOUNTER — OUTPATIENT (OUTPATIENT)
Dept: OUTPATIENT SERVICES | Facility: HOSPITAL | Age: 68
LOS: 1 days | End: 2023-12-08
Payer: MEDICARE

## 2023-12-08 VITALS
HEIGHT: 70 IN | OXYGEN SATURATION: 97 % | BODY MASS INDEX: 23.05 KG/M2 | SYSTOLIC BLOOD PRESSURE: 130 MMHG | WEIGHT: 161 LBS | HEART RATE: 81 BPM | DIASTOLIC BLOOD PRESSURE: 82 MMHG

## 2023-12-08 DIAGNOSIS — J06.9 ACUTE UPPER RESPIRATORY INFECTION, UNSPECIFIED: ICD-10-CM

## 2023-12-08 DIAGNOSIS — I10 ESSENTIAL (PRIMARY) HYPERTENSION: ICD-10-CM

## 2023-12-08 DIAGNOSIS — E78.5 HYPERLIPIDEMIA, UNSPECIFIED: ICD-10-CM

## 2023-12-08 DIAGNOSIS — R07.89 OTHER CHEST PAIN: ICD-10-CM

## 2023-12-08 DIAGNOSIS — Z23 ENCOUNTER FOR IMMUNIZATION: ICD-10-CM

## 2023-12-08 PROCEDURE — G0008: CPT

## 2023-12-08 PROCEDURE — 99215 OFFICE O/P EST HI 40 MIN: CPT | Mod: 25

## 2023-12-08 PROCEDURE — 93010 ELECTROCARDIOGRAM REPORT: CPT

## 2023-12-08 PROCEDURE — 90662 IIV NO PRSV INCREASED AG IM: CPT

## 2023-12-08 PROCEDURE — G0463: CPT | Mod: 25

## 2023-12-18 ENCOUNTER — TRANSCRIPTION ENCOUNTER (OUTPATIENT)
Age: 68
End: 2023-12-18

## 2023-12-18 LAB
ALBUMIN SERPL ELPH-MCNC: 4.6 G/DL
ALP BLD-CCNC: 81 U/L
ALT SERPL-CCNC: 18 U/L
ANION GAP SERPL CALC-SCNC: 12 MMOL/L
AST SERPL-CCNC: 19 U/L
BILIRUB SERPL-MCNC: 0.3 MG/DL
BUN SERPL-MCNC: 25 MG/DL
CALCIUM SERPL-MCNC: 10 MG/DL
CHLORIDE SERPL-SCNC: 101 MMOL/L
CHOLEST SERPL-MCNC: 162 MG/DL
CO2 SERPL-SCNC: 26 MMOL/L
CREAT SERPL-MCNC: 0.99 MG/DL
EGFR: 84 ML/MIN/1.73M2
ESTIMATED AVERAGE GLUCOSE: 117 MG/DL
GLUCOSE SERPL-MCNC: 81 MG/DL
HBA1C MFR BLD HPLC: 5.7 %
HCT VFR BLD CALC: 44.3 %
HDLC SERPL-MCNC: 57 MG/DL
HGB BLD-MCNC: 14.1 G/DL
LDLC SERPL CALC-MCNC: 86 MG/DL
LDLC SERPL DIRECT ASSAY-MCNC: 87 MG/DL
MCHC RBC-ENTMCNC: 31.8 GM/DL
MCHC RBC-ENTMCNC: 32 PG
MCV RBC AUTO: 100.7 FL
NONHDLC SERPL-MCNC: 105 MG/DL
PLATELET # BLD AUTO: 282 K/UL
POTASSIUM SERPL-SCNC: 4.5 MMOL/L
PROT SERPL-MCNC: 7.1 G/DL
RBC # BLD: 4.4 M/UL
RBC # FLD: 13.4 %
SODIUM SERPL-SCNC: 138 MMOL/L
TRIGL SERPL-MCNC: 104 MG/DL
TSH SERPL-ACNC: 2.14 UIU/ML
WBC # FLD AUTO: 11.5 K/UL

## 2023-12-18 NOTE — HISTORY OF PRESENT ILLNESS
[de-identified] : Here for following: -Viral URI with cough that developed over a month ago around mid November 2023.  Home test for COVID was negative but he had persistent cough and wheezing with shortness of breath.  He contacted his pulmonologist Dr. Hooker who prescribed him a 2-week prednisone course with a taper, to use along with his Symbicort inhaler.  He noted improvement with the wheezing but still has a sporadic cough and feels at times some substernal chest pressure not related to exertion but more when coughing.  He denies fever, chills, dyspnea on exertion. History of CAD, S/P stent in 2022 at [facility], HTN, HLD, needs follow-up labs to check his lipids and his A1c that has been borderline , last A1c 6%.  He has cut down on his alcohol intake.  He has not yet had chance to establish with a clinical cardiologist, as he would like to find someone in the [facility] system, as his interventional cardiologist is Dr. Serrano at [place] who did cath and stent in 2022..  EKG ordered today with no acute ST/T wave changes noted.  Currently asymptomatic with no chest pain now. -History of HLD-on rosuvastatin 40 mg p.o. daily-still active with his running, diet is fair with goal LDL cholesterol to be less then 70

## 2024-01-02 ENCOUNTER — APPOINTMENT (OUTPATIENT)
Dept: RADIOLOGY | Facility: CLINIC | Age: 69
End: 2024-01-02
Payer: MEDICARE

## 2024-01-02 ENCOUNTER — OUTPATIENT (OUTPATIENT)
Dept: OUTPATIENT SERVICES | Facility: HOSPITAL | Age: 69
LOS: 1 days | End: 2024-01-02
Payer: MEDICARE

## 2024-01-02 DIAGNOSIS — R09.82 POSTNASAL DRIP: ICD-10-CM

## 2024-01-02 DIAGNOSIS — J06.9 ACUTE UPPER RESPIRATORY INFECTION, UNSPECIFIED: ICD-10-CM

## 2024-01-02 PROCEDURE — 71046 X-RAY EXAM CHEST 2 VIEWS: CPT | Mod: 26

## 2024-01-02 PROCEDURE — 71046 X-RAY EXAM CHEST 2 VIEWS: CPT

## 2024-01-05 RX ORDER — ROSUVASTATIN CALCIUM 40 MG/1
40 TABLET, FILM COATED ORAL
Qty: 90 | Refills: 3 | Status: ACTIVE | COMMUNITY
Start: 2022-07-09 | End: 1900-01-01

## 2024-01-22 ENCOUNTER — APPOINTMENT (OUTPATIENT)
Dept: PULMONOLOGY | Facility: CLINIC | Age: 69
End: 2024-01-22
Payer: MEDICARE

## 2024-01-22 VITALS
HEIGHT: 70 IN | BODY MASS INDEX: 23.19 KG/M2 | DIASTOLIC BLOOD PRESSURE: 80 MMHG | HEART RATE: 80 BPM | TEMPERATURE: 97.1 F | WEIGHT: 162 LBS | SYSTOLIC BLOOD PRESSURE: 140 MMHG | RESPIRATION RATE: 1 BRPM | OXYGEN SATURATION: 95 %

## 2024-01-22 DIAGNOSIS — R06.83 SNORING: ICD-10-CM

## 2024-01-22 DIAGNOSIS — J45.909 UNSPECIFIED ASTHMA, UNCOMPLICATED: ICD-10-CM

## 2024-01-22 PROCEDURE — 99214 OFFICE O/P EST MOD 30 MIN: CPT | Mod: 25

## 2024-01-22 PROCEDURE — 90715 TDAP VACCINE 7 YRS/> IM: CPT | Mod: GY

## 2024-01-22 PROCEDURE — 94060 EVALUATION OF WHEEZING: CPT

## 2024-01-22 PROCEDURE — 95012 NITRIC OXIDE EXP GAS DETER: CPT

## 2024-01-22 PROCEDURE — ZZZZZ: CPT

## 2024-01-22 PROCEDURE — 90471 IMMUNIZATION ADMIN: CPT

## 2024-01-22 RX ORDER — AZITHROMYCIN 250 MG/1
250 TABLET, FILM COATED ORAL
Qty: 1 | Refills: 0 | Status: DISCONTINUED | COMMUNITY
Start: 2023-12-08 | End: 2024-01-22

## 2024-01-22 NOTE — PHYSICAL EXAM
[No Acute Distress] : no acute distress [Normal Oropharynx] : normal oropharynx [III] : Mallampati Class: III [Normal Appearance] : normal appearance [No Neck Mass] : no neck mass [Normal Rate/Rhythm] : normal rate/rhythm [Normal S1, S2] : normal s1, s2 [No Murmurs] : no murmurs [No Resp Distress] : no resp distress [Clear to Auscultation Bilaterally] : clear to auscultation bilaterally [No Abnormalities] : no abnormalities [Benign] : benign [Normal Gait] : normal gait [No Clubbing] : no clubbing [No Cyanosis] : no cyanosis [No Edema] : no edema [FROM] : FROM [Normal Color/ Pigmentation] : normal color/ pigmentation [No Focal Deficits] : no focal deficits [Oriented x3] : oriented x3 [Normal Affect] : normal affect [TextBox_68] : I:E ratio 1:3; very mild expiratory wheezes

## 2024-01-22 NOTE — ADDENDUM
[FreeTextEntry1] : Documented by Ronaldo Fiore acting as a scribe for Dr. Anthony Hooker on 01/22/2024.   All medical record entries made by the Scribe were at my, Dr. Anthony Hooker's, direction and personally dictated by me on 01/22/2024. I have reviewed the chart and agree that the record accurately reflects my personal performance of the history, physical exam, assessment and plan. I have also personally directed, reviewed, and agree with the discharge instructions.

## 2024-01-22 NOTE — PROCEDURE
[FreeTextEntry1] : PFT reveals mild obstructive dysfunction, with an FEV1 of 2.56 L, which is 75% of predicted with no change after bronchodilators, with a normal flow volume loop. PFTs were performed to evaluate for Asthma  FENO was 161; a normal value being less than 25 Fractional exhaled nitric oxide (FENO) is regarded as a simple, noninvasive method for assessing eosinophilic airway inflammation. Produced by a variety of cells within the lung, nitric oxide (NO) concentrations are generally low in healthy individuals. However, high concentrations of NO appear to be involved in nonspecific host defense mechanisms and chronic inflammatory diseases such as asthma. The American Thoracic Society (ATS) therefore has strongly recommended using FENO to aid in the assessment, management, and long-term monitoring of eosinophilic airway inflammation and asthma, and for identifying steroid responsive individuals whose chronic respiratory symptoms may be caused by airway inflammation. In their 2011 clinical practice guideline, the ATS emphasizes the importance of using FENO.

## 2024-01-22 NOTE — REASON FOR VISIT
[Follow-Up] : a follow-up visit [TextBox_44] : (+) COVID-19 infection, primary snoring, SOB, RADS likely Asthma, PND, RESHMA

## 2024-01-22 NOTE — ASSESSMENT
[FreeTextEntry1] : Mr. VILLA is a 68 year old male with a history of CAD s/p stent 7/2022 HTN, cholesterol, BPH, likely suspected COVID-19 infection who now comes to the office for a pulmonary evaluation s/p suspected COVID with pedestrian symptoms (resolved); still snoring (NC)- s/p cardiac stent 6/2022, sx c/w asthma (s/p prednisone 4/2023)- improved; s/p COVID-19 8/2023; ?Active Asthma Sx   Problem 1: RADS/Asthma- (Active) -s/p Symbicort 160 2 inhalations BID --> now Bevespi 2 inhalations daily (QD)/ Alvesco 160 1 inhalations BID or Qvar 80 2 puffs BID -continue Ventolin 2 puffs Q6H, pre-exercise, prednisone => Bevespi 2 inhalations daily (QD)/ Qvar 80 2 puffs BID Asthma is believed to be caused by inherited (genetic) and environmental factor, but its exact cause is unknown. Asthma may be triggered by allergens, lung infections, or irritants in the air. Asthma triggers are different for each person -Inhaler technique reviewed as well as oral hygiene techniques reviewed with patient. Avoidance of cold air, extremes of temperature, rescue inhaler should be used before exercise. Order of medication reviewed with patient. Recommended use of a cool mist humidifier in the bedroom. - Asthma is believed to be caused by inherited (genetic) and environmental factor, but its exact cause is unknown. Asthma may be triggered by allergens, lung infections, or irritants in the air. Asthma triggers are different for each person  problem 1A: s/p suspected COVID-19 Infection (s/p MAB) -resolved, continued 8/2023 (s/p Paxlovid) Rx - recommended to quarantine for 10 days -s/p Pfizer COVID 19 vaccine x 2 -s/p COVID 19 Antibody screening (+) -educated patient on COVID 19 vaccine and appropriate recommendations Immune Support Recommendations: -OTC Vitamin C 1000mg BID -OTC Quercetin 1000mg BID -OTC Zinc 50-100mg per day -OTC Melatonin 5mg a night -OTC Vitamin D 2000mg per day - recommended hydration - recommended Baby Aspirin 30  Problem 1B: Elevated IgE ~232 - Xolair Candidate -Xolair is a recombinant DNA- derived humanized IgG1K monoclonal antibody that selectively binds ot human immunoglobulin E (IgE). Xolair is produced by a Chinese hamster ovary cell suspension culture in nutrient medium containing the antibiotic gentamicin. Gentamicin is not detectable in the final product. Xolair is a sterile, white, preservative free, lyophilized powder contained in a single use vial that is reconstituted with sterile water for suspension. Side effects include: wheezing, tightness of the chest, trouble breathing, hives, skin rash, feeling anxious or light-headed, fainting, warmth or tingling under skin, or swelling of face, lips, or tongue  Problem 1C: + Eosinophilic Asthma - All biologic candidate- if needed - s/p Check strongyloides antibodies (negative) - s/p hypersensitivity panel +- consider immunology evaluation for mold and mold remediation -The safety and efficacy of Nucala was established in three double-blind, randomized, placebo-controlled trials in patients with severe asthma. Compared to a placebo, patients with severe asthma receiving Nucala had fewer exacerbation requiring hospitalization and/or emergency department visits, and a longer time to first exacerbation. In addition, patients with severe asthma receiving Nucala or Fasenra experienced greater reductions in their daily maintenance oral corticosteroid dose, while maintaining asthma control compared with patients receiving placebo. Treatment with Nucala did not result in a significant improvement in lung function, as measured by the volume of air exhaled by patients in one second. The most common side effects include: headache, injection site reactions, back pain, weakness, and fatigue; hypersensitivity reactions can occur within hours or days including swelling of the face, mouth, and tongue, fainting, dizziness, hives, breathing problems, and rash; herpes zoster infections have occurred. The drug is a monoclonal antibody that inhibits interleukin-5 which helps regular eosinophils, a type of white blood cell that contributes to asthma. The over-production of eosinophils can cause inflammation in the lungs, increasing the frequency of asthma attacks. Patients must also take other medications, including high dose inhaled corticosteroids and at least one additional asthma drug.  Problem 2: CAD s/p Stent -continue evaluation with [facility] 7/2022  problem 3: allergies / sinus  -continue Flonase 1 sniff/nostril BID - Environmental measures for allergies were encouraged including mattress and pillow cover, air purifier, and environmental controls.  Problem 4: GERD -Add Pepcid 40 mg QHS -Rule of 2s: avoid eating too much, eating too late, eating too spicy, eating two hours before bed. -Things to avoid including overeating, spicy foods, tight clothing, eating within three hours of bed, this list is not all inclusive. -For treatment of reflux, possible options discussed including diet control, H2 blockers, PPIs, as well as coating motility agents discussed as treatment options. Timing of meals and proximity of last meal to sleep were discussed. If symptoms persist, a formal gastrointestinal evaluation is needed.  problem 5: poor breathing mechanics -recommended Jean Cheungf and Buteyko breathing techniques -Proper breathing techniques were reviewed with an emphasis of exhalation. Patient instructed to breath in for 1 second and out for four seconds. Patient was encouraged to not talk while walking.  Problem 6: primary snoring/ +RESHMA (mild) - recommended positional sleep -complete home sleep study (NC due to insurance conflict)- s/p medicare - dental device/Excite/[place] Pro -recommended Slumber Bump -recommended Somnifix -recommended OxyAid Good sleep hygiene was encouraged including wearing sunglasses 30 minutes before bed, avoiding watching television an hour before bed, keeping caffeine at a low, avoiding reading, television, or anything, in bed, no drinking any liquids three hours before bedtime, and only getting into bed when tired and ready for sleep.  problem 7: health maintenance s/p TDAP vaccine (given in the office 1/22/2024) -recommended RSV vaccine in the Fall for anyone over the age of 60 -add Mitovive -recommended yearly flu shot after October 15 (2023) -recommended strep pneumonia vaccines: Prevnar-13 vaccine 4/2022, followed by Pneumo vaccine 23 one year following after 65 years old. -recommended early intervention for Upper Respiratory Infections (URIs) -recommended regular osteoporosis evaluations -recommended early dermatological evaluations -recommended after the age of 50 to the age of 70, colonoscopy every 5 years  F/U in 4-6 months  He is encouraged to call with any changes, concerns, or questions.

## 2024-01-22 NOTE — HISTORY OF PRESENT ILLNESS
[TextBox_4] : Mr. VILLA is a 68 year old male presenting to the office today for pulmonary follow up. His chief complaint is  - he notes having troubles running in the cold air due to some SOB - he notes swimming for exercise but has been getting winded more than usual  - he notes he feels as if something is wrong with his breathing and it has been getting worse - he notes not getting his nuclear stress test due to a miscommunication with his cardiologist - he notes getting some chest pain - he notes occasional nasal congestion and does feel some mucus  - he notes using his Symbicort which does not give him much relief  - he notes getting his flu shot   -he denies any headaches, nausea, emesis, fever, chills, sweats, chest pressure, coughing, wheezing, palpitations, constipation, diarrhea, vertigo, dysphagia, heartburn, reflux, itchy eyes, itchy ears, leg swelling, leg pain, arthralgias, myalgias, or sour taste in the mouth.

## 2024-03-17 ENCOUNTER — NON-APPOINTMENT (OUTPATIENT)
Age: 69
End: 2024-03-17

## 2024-04-03 ENCOUNTER — TRANSCRIPTION ENCOUNTER (OUTPATIENT)
Age: 69
End: 2024-04-03

## 2024-04-17 RX ORDER — MONTELUKAST 10 MG/1
10 TABLET, FILM COATED ORAL
Qty: 30 | Refills: 4 | Status: ACTIVE | COMMUNITY
Start: 2023-02-28 | End: 1900-01-01

## 2024-04-17 RX ORDER — BECLOMETHASONE DIPROPIONATE HFA 80 UG/1
80 AEROSOL, METERED RESPIRATORY (INHALATION) TWICE DAILY
Qty: 1 | Refills: 3 | Status: ACTIVE | COMMUNITY
Start: 2023-05-01 | End: 1900-01-01

## 2024-04-25 ENCOUNTER — NON-APPOINTMENT (OUTPATIENT)
Age: 69
End: 2024-04-25

## 2024-04-26 ENCOUNTER — APPOINTMENT (OUTPATIENT)
Dept: INTERNAL MEDICINE | Facility: CLINIC | Age: 69
End: 2024-04-26
Payer: MEDICARE

## 2024-04-26 ENCOUNTER — OUTPATIENT (OUTPATIENT)
Dept: OUTPATIENT SERVICES | Facility: HOSPITAL | Age: 69
LOS: 1 days | End: 2024-04-26
Payer: MEDICARE

## 2024-04-26 ENCOUNTER — NON-APPOINTMENT (OUTPATIENT)
Age: 69
End: 2024-04-26

## 2024-04-26 VITALS
WEIGHT: 165 LBS | BODY MASS INDEX: 23.62 KG/M2 | DIASTOLIC BLOOD PRESSURE: 76 MMHG | HEIGHT: 70 IN | SYSTOLIC BLOOD PRESSURE: 134 MMHG | OXYGEN SATURATION: 98 % | HEART RATE: 64 BPM

## 2024-04-26 DIAGNOSIS — R31.29 OTHER MICROSCOPIC HEMATURIA: ICD-10-CM

## 2024-04-26 DIAGNOSIS — Z12.11 ENCOUNTER FOR SCREENING FOR MALIGNANT NEOPLASM OF COLON: ICD-10-CM

## 2024-04-26 DIAGNOSIS — E78.5 HYPERLIPIDEMIA, UNSPECIFIED: ICD-10-CM

## 2024-04-26 DIAGNOSIS — I25.10 ATHEROSCLEROTIC HEART DISEASE OF NATIVE CORONARY ARTERY W/OUT ANGINA PECTORIS: ICD-10-CM

## 2024-04-26 DIAGNOSIS — R35.0 FREQUENCY OF MICTURITION: ICD-10-CM

## 2024-04-26 DIAGNOSIS — I10 ESSENTIAL (PRIMARY) HYPERTENSION: ICD-10-CM

## 2024-04-26 PROCEDURE — G0444 DEPRESSION SCREEN ANNUAL: CPT

## 2024-04-26 PROCEDURE — G0439: CPT

## 2024-04-26 RX ORDER — AZELASTINE HYDROCHLORIDE 137 UG/1
137 SPRAY, METERED NASAL
Qty: 1 | Refills: 3 | Status: DISCONTINUED | COMMUNITY
Start: 2023-02-28 | End: 2024-04-26

## 2024-04-26 RX ORDER — FLUTICASONE PROPIONATE 50 UG/1
50 SPRAY, METERED NASAL TWICE DAILY
Qty: 1 | Refills: 3 | Status: DISCONTINUED | COMMUNITY
Start: 2023-08-24 | End: 2024-04-26

## 2024-04-26 RX ORDER — BENZONATATE 200 MG/1
200 CAPSULE ORAL 3 TIMES DAILY
Qty: 60 | Refills: 0 | Status: DISCONTINUED | COMMUNITY
Start: 2023-02-28 | End: 2024-04-26

## 2024-04-27 PROBLEM — I25.10 CORONARY ARTERY DISEASE: Status: ACTIVE | Noted: 2022-06-27

## 2024-04-27 PROBLEM — Z12.11 ENCOUNTER FOR SCREENING COLONOSCOPY: Status: ACTIVE | Noted: 2022-04-21

## 2024-04-27 LAB
ALBUMIN SERPL ELPH-MCNC: 4.6 G/DL
ALP BLD-CCNC: 56 U/L
ALT SERPL-CCNC: 24 U/L
ANION GAP SERPL CALC-SCNC: 13 MMOL/L
APPEARANCE: CLEAR
AST SERPL-CCNC: 24 U/L
BACTERIA: NEGATIVE /HPF
BILIRUB SERPL-MCNC: 0.3 MG/DL
BILIRUBIN URINE: NEGATIVE
BLOOD URINE: NEGATIVE
BUN SERPL-MCNC: 27 MG/DL
CALCIUM SERPL-MCNC: 9.8 MG/DL
CAST: 0 /LPF
CHLORIDE SERPL-SCNC: 104 MMOL/L
CHOLEST SERPL-MCNC: 147 MG/DL
CO2 SERPL-SCNC: 25 MMOL/L
COLOR: NORMAL
CREAT SERPL-MCNC: 1.02 MG/DL
EGFR: 81 ML/MIN/1.73M2
EPITHELIAL CELLS: 0 /HPF
ESTIMATED AVERAGE GLUCOSE: 126 MG/DL
GLUCOSE QUALITATIVE U: NEGATIVE MG/DL
GLUCOSE SERPL-MCNC: 81 MG/DL
HBA1C MFR BLD HPLC: 6 %
HCT VFR BLD CALC: 41.9 %
HDLC SERPL-MCNC: 62 MG/DL
HGB BLD-MCNC: 13.7 G/DL
KETONES URINE: ABNORMAL MG/DL
LDLC SERPL CALC-MCNC: 73 MG/DL
LDLC SERPL DIRECT ASSAY-MCNC: 72 MG/DL
LEUKOCYTE ESTERASE URINE: NEGATIVE
MCHC RBC-ENTMCNC: 31.8 PG
MCHC RBC-ENTMCNC: 32.7 GM/DL
MCV RBC AUTO: 97.2 FL
MICROSCOPIC-UA: NORMAL
NITRITE URINE: NEGATIVE
NONHDLC SERPL-MCNC: 85 MG/DL
PH URINE: 5.5
PLATELET # BLD AUTO: 236 K/UL
POTASSIUM SERPL-SCNC: 4.7 MMOL/L
PROT SERPL-MCNC: 7.1 G/DL
PROTEIN URINE: NORMAL MG/DL
PSA SERPL-MCNC: 3.58 NG/ML
RBC # BLD: 4.31 M/UL
RBC # FLD: 13 %
RED BLOOD CELLS URINE: 2 /HPF
SODIUM SERPL-SCNC: 141 MMOL/L
SPECIFIC GRAVITY URINE: >1.03
TRIGL SERPL-MCNC: 62 MG/DL
TSH SERPL-ACNC: 1.79 UIU/ML
UROBILINOGEN URINE: 1 MG/DL
WBC # FLD AUTO: 8.09 K/UL
WHITE BLOOD CELLS URINE: 0 /HPF

## 2024-04-27 NOTE — HEALTH RISK ASSESSMENT
[Yes] : Yes [4 or more  times a week (4 pts)] : 4 or more  times a week (4 points) [1 or 2 (0 pts)] : 1 or 2 (0 points) [Never (0 pts)] : Never (0 points) [0] : 2) Feeling down, depressed, or hopeless: Not at all (0) [PHQ-2 Negative - No further assessment needed] : PHQ-2 Negative - No further assessment needed [de-identified] : 1-2wines day [Audit-CScore] : 4 [[id number]] : 0

## 2024-04-27 NOTE — HISTORY OF PRESENT ILLNESS
[de-identified] : 69-year-old male history of CAD, s/p stent in 2022 at [facility], hypertension, hyperlipidemia here for his annual wellness visit.   ,He has no complaints.  He denies chest pain, shortness of breath dizziness, palpitations.  He is still active with his running but less in winter months, regular diet with 1-2 glasses of alcohol/wine for dinner,.  He has not yet established with a clinical cardiologist at [facility], but sees his interventional cardiologist Dr Mariee who did stent in 2022. Has seen in past [facility] cardiology group DR Haywood but has not followed up with them since stent, as he is  considering transferring his cardiac care to [facility].  He is undergoing repeat cardiac studies with DR Mariee in 2024 , resuls have not been forwarded to me. He is questioning today need to increase his Rosuvastatin 20mg to 40mg as he states DR Mariee says :20mg is fine" -History of HLD, on rosuvastatin increase from 20mg to 40 mg p.o. daily in 2022 after stent, but when asked, he is not clear what dose he hs been on until he picked his refill this Jan 2024 and saw it was 40mg, Goal LDL cholesterol to be less than  55-70. Made aware again of need to reinforce healthy food choices and limit alcohol to no more than 4 drinks in a week but it is daily routine and also  hard to do as a  and hard to change his daily habits. and routine.  Hx microspopic hematura and urinary frequency, nocturia 1-2-f/u u/a and PSA -Has not gotten Shingrix x 2 as of 2024 and reinforce importance to go to his pharmacy where it is covered under Medicare. Prevnar 20 -4/21/2022 Offer colonoscopy-GI referral given 2024 Clinical cardiology referral-can go to someone at [facility] or [facility]-(known to Dr Haywood, or to lipid specialist Dr Debora Butt-referal given Full labs to be drawn including lipids (ate breakfast  todayin am-"was unaware he had to fast, water allowed"

## 2024-04-27 NOTE — ASSESSMENT
[FreeTextEntry1] : 69-year-old male history of CAD, s/p stent in 2022 at [facility], hypertension, hyperlipidemia here for his annual wellness visit.   ,He has no complaints.  He denies chest pain, shortness of breath dizziness, palpitations.  He is still active with his running but less in winter months, diet is fair with 1 ro occasioanl 2 glasses of alcohol for dinner.  He has not yet established with a clinical cardiologist at [facility], but sees interventional cardiologist Dr Mariee. Has seen [facility] cardiology group DR Haywood but has not followed up with them, as considering transferring care to [facility].  Questioning need to increase his Rosuvastatin 20mg to 40mg as he states DR Mariee says :20mg is fine" -History of HLD, on rosuvastatin increase from 20mg to 40 mg p.o. daily in 2022 after stent, but he is not clear what dose he hs been on until he picked his refill thisJan 2024 and saw it was 40mg, Goal LDL cholesterol to be less than 70. Made aware again of need to reinforce healthy food choices and limit alcohol to no more than 4 drinks in a week but hard to do as a  and hard to change his daily habits. and routine. -Has not gotten Shingrix x 2 as of 2024 and reinforce importance to go to his pharmacy where it is covered under Medicare. Prevnar 20 -4/21/2022 Offer colonoscopy-GI referral given 2024 Clinical cardiology referral-can go to someone at [facility] or [facility]-(known to Dr Haywood, or to lipid specialist Dr Debora Butt-referal given Request cardiac workup he says is being done at [facility] in 20204 to be sent to me to file in his records Full labs to be drawn including lipids (ate breakfast in am-"was unaware he had to fast, water allowed"

## 2024-04-30 ENCOUNTER — TRANSCRIPTION ENCOUNTER (OUTPATIENT)
Age: 69
End: 2024-04-30

## 2024-05-06 DIAGNOSIS — I25.10 ATHEROSCLEROTIC HEART DISEASE OF NATIVE CORONARY ARTERY WITHOUT ANGINA PECTORIS: ICD-10-CM

## 2024-05-06 DIAGNOSIS — Z12.11 ENCOUNTER FOR SCREENING FOR MALIGNANT NEOPLASM OF COLON: ICD-10-CM

## 2024-05-06 DIAGNOSIS — R31.29 OTHER MICROSCOPIC HEMATURIA: ICD-10-CM

## 2024-05-06 DIAGNOSIS — E78.5 HYPERLIPIDEMIA, UNSPECIFIED: ICD-10-CM

## 2024-05-06 DIAGNOSIS — Z13.31 ENCOUNTER FOR SCREENING FOR DEPRESSION: ICD-10-CM

## 2024-05-06 DIAGNOSIS — R35.0 FREQUENCY OF MICTURITION: ICD-10-CM

## 2024-05-06 DIAGNOSIS — Z00.00 ENCOUNTER FOR GENERAL ADULT MEDICAL EXAMINATION WITHOUT ABNORMAL FINDINGS: ICD-10-CM

## 2024-05-22 ENCOUNTER — APPOINTMENT (OUTPATIENT)
Dept: PULMONOLOGY | Facility: CLINIC | Age: 69
End: 2024-05-22
Payer: MEDICARE

## 2024-05-22 VITALS
BODY MASS INDEX: 23.19 KG/M2 | OXYGEN SATURATION: 98 % | SYSTOLIC BLOOD PRESSURE: 128 MMHG | TEMPERATURE: 97.5 F | RESPIRATION RATE: 16 BRPM | HEIGHT: 70 IN | DIASTOLIC BLOOD PRESSURE: 70 MMHG | HEART RATE: 65 BPM | WEIGHT: 162 LBS

## 2024-05-22 DIAGNOSIS — R76.8 OTHER SPECIFIED ABNORMAL IMMUNOLOGICAL FINDINGS IN SERUM: ICD-10-CM

## 2024-05-22 DIAGNOSIS — J30.9 ALLERGIC RHINITIS, UNSPECIFIED: ICD-10-CM

## 2024-05-22 DIAGNOSIS — R06.02 SHORTNESS OF BREATH: ICD-10-CM

## 2024-05-22 DIAGNOSIS — J82.83 EOSINOPHILIC ASTHMA: ICD-10-CM

## 2024-05-22 DIAGNOSIS — J45.909 UNSPECIFIED ASTHMA, UNCOMPLICATED: ICD-10-CM

## 2024-05-22 DIAGNOSIS — K21.9 GASTRO-ESOPHAGEAL REFLUX DISEASE W/OUT ESOPHAGITIS: ICD-10-CM

## 2024-05-22 DIAGNOSIS — G47.30 SLEEP APNEA, UNSPECIFIED: ICD-10-CM

## 2024-05-22 PROCEDURE — 94010 BREATHING CAPACITY TEST: CPT

## 2024-05-22 PROCEDURE — ZZZZZ: CPT

## 2024-05-22 PROCEDURE — 95012 NITRIC OXIDE EXP GAS DETER: CPT

## 2024-05-22 PROCEDURE — 99214 OFFICE O/P EST MOD 30 MIN: CPT | Mod: 25

## 2024-05-22 RX ORDER — FAMOTIDINE 40 MG/1
40 TABLET, FILM COATED ORAL
Qty: 90 | Refills: 1 | Status: ACTIVE | COMMUNITY
Start: 2022-04-25 | End: 1900-01-01

## 2024-05-22 RX ORDER — PREDNISONE 10 MG/1
10 TABLET ORAL
Qty: 21 | Refills: 0 | Status: DISCONTINUED | COMMUNITY
Start: 2023-11-22 | End: 2024-05-22

## 2024-05-22 RX ORDER — NIRMATRELVIR AND RITONAVIR 300-100 MG
20 X 150 MG & KIT ORAL
Qty: 1 | Refills: 0 | Status: DISCONTINUED | COMMUNITY
Start: 2023-08-24 | End: 2024-05-22

## 2024-05-22 RX ORDER — TIOTROPIUM BROMIDE INHALATION SPRAY 3.12 UG/1
2.5 SPRAY, METERED RESPIRATORY (INHALATION) DAILY
Qty: 3 | Refills: 1 | Status: DISCONTINUED | COMMUNITY
Start: 2023-05-18 | End: 2024-05-22

## 2024-05-22 RX ORDER — BUDESONIDE AND FORMOTEROL FUMARATE DIHYDRATE 160; 4.5 UG/1; UG/1
160-4.5 AEROSOL RESPIRATORY (INHALATION) TWICE DAILY
Qty: 3 | Refills: 1 | Status: DISCONTINUED | COMMUNITY
Start: 2023-05-18 | End: 2024-05-22

## 2024-05-22 NOTE — ADDENDUM
[FreeTextEntry1] : Documented by Ronaldo Fiore acting as a scribe for Dr. Anthony Hooker on 05/22/2024.   All medical record entries made by the Scribe were at my, Dr. Anthony Hooker's, direction and personally dictated by me on 05/22/2024. I have reviewed the chart and agree that the record accurately reflects my personal performance of the history, physical exam, assessment and plan. I have also personally directed, reviewed, and agree with the discharge instructions.

## 2024-05-22 NOTE — REASON FOR VISIT
[Follow-Up] : a follow-up visit [TextBox_44] : (+) COVID-19 infection, primary snoring, SOB, RADS likely Asthma (Eosinophilic/IgE), PND, RESHMA

## 2024-05-22 NOTE — PROCEDURE
[FreeTextEntry1] : Full PFT reveals moderate obstructive dysfunction; FEV1 was 2.35 L which is 70% of predicted; normal flow volume loop. PFTs were performed to evaluate for Asthma  FENO was 126; a normal value being less than 25 Fractional exhaled nitric oxide (FENO) is regarded as a simple, noninvasive method for assessing eosinophilic airway inflammation. Produced by a variety of cells within the lung, nitric oxide (NO) concentrations are generally low in healthy individuals. However, high concentrations of NO appear to be involved in nonspecific host defense mechanisms and chronic inflammatory diseases such as asthma. The American Thoracic Society (ATS) therefore has strongly recommended using FENO to aid in the assessment, management, and long-term monitoring of eosinophilic airway inflammation and asthma, and for identifying steroid responsive individuals whose chronic respiratory symptoms may be caused by airway inflammation. In their 2011 clinical practice guideline, the ATS emphasizes the importance of using FENO.

## 2024-05-22 NOTE — ASSESSMENT
[FreeTextEntry1] : Mr. VILLA is a 69 year old male with a history of CAD s/p stent 7/2022 HTN, cholesterol, BPH, likely suspected COVID-19 infection who now comes to the office for a pulmonary evaluation s/p suspected COVID with pedestrian symptoms (resolved); still snoring (NC)- s/p cardiac stent 6/2022, sx c/w asthma (s/p prednisone 4/2023)- improved; s/p COVID-19 8/2023; ?Active Asthma Sx   Problem 1: RADS/Asthma- (Active) -s/p Symbicort 160 2 inhalations BID --> Stiolto 2 inhalations QD / Alvesco 160 1 inhalations BID or Qvar 80 2 puffs BID -continue Ventolin 2 puffs Q6H, pre-exercise, prednisone => Bevespi 2 inhalations daily (QD)/ Qvar 80 2 puffs BID Asthma is believed to be caused by inherited (genetic) and environmental factor, but its exact cause is unknown. Asthma may be triggered by allergens, lung infections, or irritants in the air. Asthma triggers are different for each person -Inhaler technique reviewed as well as oral hygiene techniques reviewed with patient. Avoidance of cold air, extremes of temperature, rescue inhaler should be used before exercise. Order of medication reviewed with patient. Recommended use of a cool mist humidifier in the bedroom. - Asthma is believed to be caused by inherited (genetic) and environmental factor, but its exact cause is unknown. Asthma may be triggered by allergens, lung infections, or irritants in the air. Asthma triggers are different for each person  problem 1A: s/p suspected COVID-19 Infection (s/p MAB) -resolved, continued 8/2023 (s/p Paxlovid) Rx - recommended to quarantine for 10 days -s/p Pfizer COVID 19 vaccine x 2 -s/p COVID 19 Antibody screening (+) -educated patient on COVID 19 vaccine and appropriate recommendations Immune Support Recommendations: -OTC Vitamin C 1000mg BID -OTC Quercetin 1000mg BID -OTC Zinc 50-100mg per day -OTC Melatonin 5mg a night -OTC Vitamin D 2000mg per day - recommended hydration - recommended Baby Aspirin 30  Problem 1B: Elevated IgE ~232 - Xolair Candidate -Xolair is a recombinant DNA- derived humanized IgG1K monoclonal antibody that selectively binds ot human immunoglobulin E (IgE). Xolair is produced by a Chinese hamster ovary cell suspension culture in nutrient medium containing the antibiotic gentamicin. Gentamicin is not detectable in the final product. Xolair is a sterile, white, preservative free, lyophilized powder contained in a single use vial that is reconstituted with sterile water for suspension. Side effects include: wheezing, tightness of the chest, trouble breathing, hives, skin rash, feeling anxious or light-headed, fainting, warmth or tingling under skin, or swelling of face, lips, or tongue  Problem 1C: + Eosinophilic Asthma - All biologic candidate- if needed Fasenra / Dupixent / Nucala - s/p Check strongyloides antibodies (negative) - s/p hypersensitivity panel +- consider immunology evaluation for mold and mold remediation -The safety and efficacy of Nucala was established in three double-blind, randomized, placebo-controlled trials in patients with severe asthma. Compared to a placebo, patients with severe asthma receiving Nucala had fewer exacerbation requiring hospitalization and/or emergency department visits, and a longer time to first exacerbation. In addition, patients with severe asthma receiving Nucala or Fasenra experienced greater reductions in their daily maintenance oral corticosteroid dose, while maintaining asthma control compared with patients receiving placebo. Treatment with Nucala did not result in a significant improvement in lung function, as measured by the volume of air exhaled by patients in one second. The most common side effects include: headache, injection site reactions, back pain, weakness, and fatigue; hypersensitivity reactions can occur within hours or days including swelling of the face, mouth, and tongue, fainting, dizziness, hives, breathing problems, and rash; herpes zoster infections have occurred. The drug is a monoclonal antibody that inhibits interleukin-5 which helps regular eosinophils, a type of white blood cell that contributes to asthma. The over-production of eosinophils can cause inflammation in the lungs, increasing the frequency of asthma attacks. Patients must also take other medications, including high dose inhaled corticosteroids and at least one additional asthma drug.  Problem 2: CAD s/p Stent -continue evaluation with Jaylene 7/2022  problem 3: allergies / sinus  -continue Flonase 1 sniff/nostril BID - Environmental measures for allergies were encouraged including mattress and pillow cover, air purifier, and environmental controls.  Problem 4: GERD -Add Pepcid 40 mg QHS -Rule of 2s: avoid eating too much, eating too late, eating too spicy, eating two hours before bed. -Things to avoid including overeating, spicy foods, tight clothing, eating within three hours of bed, this list is not all inclusive. -For treatment of reflux, possible options discussed including diet control, H2 blockers, PPIs, as well as coating motility agents discussed as treatment options. Timing of meals and proximity of last meal to sleep were discussed. If symptoms persist, a formal gastrointestinal evaluation is needed.  problem 5: poor breathing mechanics -recommended Wiamelia Hof and Buteyko breathing techniques -Proper breathing techniques were reviewed with an emphasis of exhalation. Patient instructed to breath in for 1 second and out for four seconds. Patient was encouraged to not talk while walking.  Problem 6: primary snoring/ +RESHMA (mild) - recommended positional sleep -complete home sleep study (NC due to insurance conflict)- s/p medicare - dental device/Excite/[place] Pro -recommended Slumber Bump -recommended Somnifix -recommended OxyAid Good sleep hygiene was encouraged including wearing sunglasses 30 minutes before bed, avoiding watching television an hour before bed, keeping caffeine at a low, avoiding reading, television, or anything, in bed, no drinking any liquids three hours before bedtime, and only getting into bed when tired and ready for sleep.  problem 7: health maintenance s/p TDAP vaccine (given in the office 1/22/2024) -recommended RSV vaccine in the Fall for anyone over the age of 60 -add Mitovive -recommended yearly flu shot after October 15 (2023) -recommended strep pneumonia vaccines: Prevnar-13 vaccine 4/2022, followed by Pneumo vaccine 23 one year following after 65 years old. -recommended early intervention for Upper Respiratory Infections (URIs) -recommended regular osteoporosis evaluations -recommended early dermatological evaluations -recommended after the age of 50 to the age of 70, colonoscopy every 5 years  F/U in 4-6 months  He is encouraged to call with any changes, concerns, or questions.

## 2024-05-22 NOTE — HISTORY OF PRESENT ILLNESS
[TextBox_4] : Mr. VILLA is a 69 year old male presenting to the office today for pulmonary follow up. His chief complaint is  - he notes running 2-3 times a week during the day - he notes when running, he finds himself needing to stop and catch his breath more than he ever has - he notes bowels are regular - sense of taste and smell are good - he notes his vision is stable - he notes his reflux is mostly controlled  - he denies any coughing when running - he notes his breathing does not feel 100% comfortable with his running  - he notes using his nebulizer daily  - he notes using Qvar  -   -he denies any headaches, nausea, emesis, fever, chills, sweats, chest pain, chest pressure, coughing, wheezing, palpitations, constipation, diarrhea, vertigo, dysphagia, heartburn, reflux, itchy eyes, itchy ears, leg swelling, leg pain, arthralgias, myalgias, hoarseness, or sour taste in the mouth.

## 2024-05-23 RX ORDER — TIOTROPIUM BROMIDE AND OLODATEROL 3.124; 2.736 UG/1; UG/1
2.5-2.5 SPRAY, METERED RESPIRATORY (INHALATION) DAILY
Qty: 3 | Refills: 1 | Status: DISCONTINUED | COMMUNITY
Start: 2024-05-22 | End: 2024-05-23

## 2024-05-29 RX ORDER — UMECLIDINIUM BROMIDE AND VILANTEROL TRIFENATATE 62.5; 25 UG/1; UG/1
62.5-25 POWDER RESPIRATORY (INHALATION)
Qty: 3 | Refills: 1 | Status: ACTIVE | COMMUNITY
Start: 2024-05-23 | End: 1900-01-01

## 2024-06-18 ENCOUNTER — TRANSCRIPTION ENCOUNTER (OUTPATIENT)
Age: 69
End: 2024-06-18

## 2024-07-29 ENCOUNTER — TRANSCRIPTION ENCOUNTER (OUTPATIENT)
Age: 69
End: 2024-07-29

## 2024-07-29 RX ORDER — TIOTROPIUM BROMIDE AND OLODATEROL 3.124; 2.736 UG/1; UG/1
2.5-2.5 SPRAY, METERED RESPIRATORY (INHALATION)
Qty: 1 | Refills: 2 | Status: ACTIVE | COMMUNITY
Start: 2024-07-29 | End: 1900-01-01

## 2024-08-02 ENCOUNTER — APPOINTMENT (OUTPATIENT)
Dept: GASTROENTEROLOGY | Facility: CLINIC | Age: 69
End: 2024-08-02
Payer: MEDICARE

## 2024-08-02 VITALS
OXYGEN SATURATION: 97 % | TEMPERATURE: 97.6 F | HEART RATE: 70 BPM | SYSTOLIC BLOOD PRESSURE: 133 MMHG | WEIGHT: 160 LBS | HEIGHT: 70 IN | BODY MASS INDEX: 22.9 KG/M2 | DIASTOLIC BLOOD PRESSURE: 75 MMHG

## 2024-08-02 DIAGNOSIS — Z95.5 PRESENCE OF CORONARY ANGIOPLASTY IMPLANT AND GRAFT: ICD-10-CM

## 2024-08-02 DIAGNOSIS — I25.10 ATHEROSCLEROTIC HEART DISEASE OF NATIVE CORONARY ARTERY W/OUT ANGINA PECTORIS: ICD-10-CM

## 2024-08-02 DIAGNOSIS — Z12.11 ENCOUNTER FOR SCREENING FOR MALIGNANT NEOPLASM OF COLON: ICD-10-CM

## 2024-08-02 PROCEDURE — 99204 OFFICE O/P NEW MOD 45 MIN: CPT

## 2024-08-02 RX ORDER — AMOXICILLIN 500 MG/1
500 TABLET, FILM COATED ORAL
Qty: 6 | Refills: 1 | Status: ACTIVE | COMMUNITY
Start: 2024-08-02 | End: 1900-01-01

## 2024-08-02 RX ORDER — SODIUM SULFATE, POTASSIUM SULFATE AND MAGNESIUM SULFATE 1.6; 3.13; 17.5 G/177ML; G/177ML; G/177ML
17.5-3.13-1.6 SOLUTION ORAL
Qty: 1 | Refills: 0 | Status: ACTIVE | COMMUNITY
Start: 2024-08-02 | End: 1900-01-01

## 2024-08-02 RX ORDER — EZETIMIBE 10 MG/1
10 TABLET ORAL
Refills: 0 | Status: ACTIVE | COMMUNITY

## 2024-08-02 RX ORDER — ALBUTEROL 90 MCG
90 AEROSOL (GRAM) INHALATION
Refills: 0 | Status: ACTIVE | COMMUNITY

## 2024-08-02 RX ORDER — PRASUGREL HYDROCHLORIDE 10 MG/1
TABLET, COATED ORAL
Refills: 0 | Status: ACTIVE | COMMUNITY

## 2024-08-02 NOTE — ASSESSMENT
[FreeTextEntry1] : Screening colonoscopy.  Importance of colon cancer screening and the colonoscopy prep was discussed with the patient.  He understands and all questions were answered. Coronary artery disease status post coronary stenting on anticoagulation.  Cardiology clearance requested.

## 2024-08-02 NOTE — HISTORY OF PRESENT ILLNESS
[FreeTextEntry1] : 69-year-old man referred for a screening colonoscopy.  This will be his first colonoscopy.  He denies rectal bleeding, melena or hematemesis.  He has a history of coronary artery disease and status post coronary stenting.  He had a left hip replacement for hip fracture in June 2023.

## 2024-08-02 NOTE — CONSULT LETTER
[Dear  ___] : Dear  [unfilled], [Consult Letter:] : I had the pleasure of evaluating your patient, [unfilled]. [Please see my note below.] : Please see my note below. [Consult Closing:] : Thank you very much for allowing me to participate in the care of this patient.  If you have any questions, please do not hesitate to contact me. [Sincerely,] : Sincerely, [FreeTextEntry3] : Mulugeta Harper MD, FACG

## 2024-08-02 NOTE — PHYSICAL EXAM
[Alert] : alert [Normal Voice/Communication] : normal voice/communication [Healthy Appearing] : healthy appearing [No Acute Distress] : no acute distress [Sclera] : the sclera and conjunctiva were normal [Hearing Threshold Finger Rub Not [place]] : hearing was normal [Normal Lips/Gums] : the lips and gums were normal [Oropharynx] : the oropharynx was normal [Normal Appearance] : the appearance of the neck was normal [No Neck Mass] : no neck mass was observed [No Respiratory Distress] : no respiratory distress [No Acc Muscle Use] : no accessory muscle use [Respiration, Rhythm And Depth] : normal respiratory rhythm and effort [Auscultation Breath Sounds / Voice Sounds] : lungs were clear to auscultation bilaterally [Heart Rate And Rhythm] : heart rate was normal and rhythm regular [Normal S1, S2] : normal S1 and S2 [Murmurs] : no murmurs [None] : no edema [Bowel Sounds] : normal bowel sounds [Abdomen Tenderness] : non-tender [No Masses] : no abdominal mass palpated [Abdomen Soft] : soft [] : no hepatosplenomegaly [Cervical Lymph Nodes Enlarged Posterior Bilaterally] : no posterior cervical lymphadenopathy [Supraclavicular Lymph Nodes Enlarged Bilaterally] : no supraclavicular lymphadenopathy [No CVA Tenderness] : no CVA  tenderness [Abnormal Walk] : normal gait [Motor Exam] : the motor exam was normal [Normal] : oriented to person, place, and time [Oriented To Time, Place, And Person] : oriented to person, place, and time [Normal Affect] : the affect was normal [Normal Mood] : the mood was normal

## 2024-08-26 ENCOUNTER — TRANSCRIPTION ENCOUNTER (OUTPATIENT)
Age: 69
End: 2024-08-26

## 2024-10-21 ENCOUNTER — APPOINTMENT (OUTPATIENT)
Dept: PULMONOLOGY | Facility: CLINIC | Age: 69
End: 2024-10-21
Payer: MEDICARE

## 2024-10-21 VITALS
HEART RATE: 66 BPM | HEIGHT: 70 IN | OXYGEN SATURATION: 97 % | BODY MASS INDEX: 22.9 KG/M2 | SYSTOLIC BLOOD PRESSURE: 128 MMHG | WEIGHT: 160 LBS | DIASTOLIC BLOOD PRESSURE: 80 MMHG

## 2024-10-21 DIAGNOSIS — J45.909 UNSPECIFIED ASTHMA, UNCOMPLICATED: ICD-10-CM

## 2024-10-21 DIAGNOSIS — J30.9 ALLERGIC RHINITIS, UNSPECIFIED: ICD-10-CM

## 2024-10-21 DIAGNOSIS — R76.8 OTHER SPECIFIED ABNORMAL IMMUNOLOGICAL FINDINGS IN SERUM: ICD-10-CM

## 2024-10-21 DIAGNOSIS — K21.9 GASTRO-ESOPHAGEAL REFLUX DISEASE W/OUT ESOPHAGITIS: ICD-10-CM

## 2024-10-21 DIAGNOSIS — R09.82 POSTNASAL DRIP: ICD-10-CM

## 2024-10-21 DIAGNOSIS — R06.02 SHORTNESS OF BREATH: ICD-10-CM

## 2024-10-21 DIAGNOSIS — J82.83 EOSINOPHILIC ASTHMA: ICD-10-CM

## 2024-10-21 PROCEDURE — 99214 OFFICE O/P EST MOD 30 MIN: CPT | Mod: 25

## 2024-10-21 PROCEDURE — 95012 NITRIC OXIDE EXP GAS DETER: CPT

## 2024-10-21 PROCEDURE — 94010 BREATHING CAPACITY TEST: CPT

## 2024-10-21 RX ORDER — ALBUTEROL SULFATE AND BUDESONIDE 90; 80 UG/1; UG/1
90-80 AEROSOL, METERED RESPIRATORY (INHALATION)
Qty: 1 | Refills: 3 | Status: ACTIVE | COMMUNITY
Start: 2024-10-21 | End: 1900-01-01

## 2024-10-23 ENCOUNTER — NON-APPOINTMENT (OUTPATIENT)
Age: 69
End: 2024-10-23

## 2024-10-23 ENCOUNTER — APPOINTMENT (OUTPATIENT)
Dept: GASTROENTEROLOGY | Facility: AMBULATORY MEDICAL SERVICES | Age: 69
End: 2024-10-23

## 2024-10-28 ENCOUNTER — NON-APPOINTMENT (OUTPATIENT)
Age: 69
End: 2024-10-28

## 2024-10-28 ENCOUNTER — TRANSCRIPTION ENCOUNTER (OUTPATIENT)
Age: 69
End: 2024-10-28

## 2024-10-29 ENCOUNTER — TRANSCRIPTION ENCOUNTER (OUTPATIENT)
Age: 69
End: 2024-10-29

## 2024-10-30 ENCOUNTER — TRANSCRIPTION ENCOUNTER (OUTPATIENT)
Age: 69
End: 2024-10-30

## 2024-11-20 ENCOUNTER — TRANSCRIPTION ENCOUNTER (OUTPATIENT)
Age: 69
End: 2024-11-20

## 2024-11-20 RX ORDER — BUDESONIDE, GLYCOPYRROLATE, AND FORMOTEROL FUMARATE 160; 9; 4.8 UG/1; UG/1; UG/1
160-9-4.8 AEROSOL, METERED RESPIRATORY (INHALATION)
Qty: 3 | Refills: 1 | Status: ACTIVE | COMMUNITY
Start: 2024-11-20 | End: 1900-01-01

## 2024-12-24 ENCOUNTER — TRANSCRIPTION ENCOUNTER (OUTPATIENT)
Age: 69
End: 2024-12-24

## 2024-12-27 ENCOUNTER — TRANSCRIPTION ENCOUNTER (OUTPATIENT)
Age: 69
End: 2024-12-27

## 2025-03-21 ENCOUNTER — APPOINTMENT (OUTPATIENT)
Dept: PULMONOLOGY | Facility: CLINIC | Age: 70
End: 2025-03-21

## 2025-03-21 VITALS
DIASTOLIC BLOOD PRESSURE: 76 MMHG | WEIGHT: 165 LBS | HEART RATE: 78 BPM | HEIGHT: 70 IN | BODY MASS INDEX: 23.62 KG/M2 | TEMPERATURE: 97.9 F | OXYGEN SATURATION: 96 % | SYSTOLIC BLOOD PRESSURE: 110 MMHG | RESPIRATION RATE: 16 BRPM

## 2025-03-21 DIAGNOSIS — J82.83 EOSINOPHILIC ASTHMA: ICD-10-CM

## 2025-03-21 DIAGNOSIS — G47.30 SLEEP APNEA, UNSPECIFIED: ICD-10-CM

## 2025-03-21 DIAGNOSIS — J45.901 UNSPECIFIED ASTHMA WITH (ACUTE) EXACERBATION: ICD-10-CM

## 2025-03-21 DIAGNOSIS — R06.02 SHORTNESS OF BREATH: ICD-10-CM

## 2025-03-21 DIAGNOSIS — J45.50 SEVERE PERSISTENT ASTHMA, UNCOMPLICATED: ICD-10-CM

## 2025-03-21 DIAGNOSIS — J30.9 ALLERGIC RHINITIS, UNSPECIFIED: ICD-10-CM

## 2025-03-21 DIAGNOSIS — K21.9 GASTRO-ESOPHAGEAL REFLUX DISEASE W/OUT ESOPHAGITIS: ICD-10-CM

## 2025-03-21 DIAGNOSIS — J45.909 UNSPECIFIED ASTHMA, UNCOMPLICATED: ICD-10-CM

## 2025-03-21 DIAGNOSIS — R76.8 OTHER SPECIFIED ABNORMAL IMMUNOLOGICAL FINDINGS IN SERUM: ICD-10-CM

## 2025-03-21 PROCEDURE — 95012 NITRIC OXIDE EXP GAS DETER: CPT

## 2025-03-21 PROCEDURE — 99214 OFFICE O/P EST MOD 30 MIN: CPT | Mod: 25

## 2025-03-21 PROCEDURE — 94010 BREATHING CAPACITY TEST: CPT

## 2025-03-21 RX ORDER — EPINEPHRINE 0.3 MG/.3ML
0.3 INJECTION INTRAMUSCULAR
Qty: 1 | Refills: 0 | Status: ACTIVE | COMMUNITY
Start: 2025-03-21 | End: 1900-01-01

## 2025-03-21 RX ORDER — BENRALIZUMAB 30 MG/ML
30 INJECTION, SOLUTION SUBCUTANEOUS
Qty: 1 | Refills: 4 | Status: ACTIVE | COMMUNITY
Start: 2025-03-21 | End: 1900-01-01

## 2025-03-24 ENCOUNTER — TRANSCRIPTION ENCOUNTER (OUTPATIENT)
Age: 70
End: 2025-03-24

## 2025-03-26 ENCOUNTER — TRANSCRIPTION ENCOUNTER (OUTPATIENT)
Age: 70
End: 2025-03-26

## 2025-03-27 ENCOUNTER — TRANSCRIPTION ENCOUNTER (OUTPATIENT)
Age: 70
End: 2025-03-27

## 2025-03-27 LAB
BASOPHILS # BLD AUTO: 0.03 K/UL
BASOPHILS NFR BLD AUTO: 0.4 %
EOSINOPHIL # BLD AUTO: 0.53 K/UL
EOSINOPHIL NFR BLD AUTO: 7.3 %
HCT VFR BLD CALC: 41.6 %
HGB BLD-MCNC: 13.7 G/DL
IMM GRANULOCYTES NFR BLD AUTO: 0.4 %
LYMPHOCYTES # BLD AUTO: 3.36 K/UL
LYMPHOCYTES NFR BLD AUTO: 46.1 %
MAN DIFF?: NORMAL
MCHC RBC-ENTMCNC: 31.9 PG
MCHC RBC-ENTMCNC: 32.9 G/DL
MCV RBC AUTO: 96.7 FL
MONOCYTES # BLD AUTO: 0.68 K/UL
MONOCYTES NFR BLD AUTO: 9.3 %
NEUTROPHILS # BLD AUTO: 2.66 K/UL
NEUTROPHILS NFR BLD AUTO: 36.5 %
PLATELET # BLD AUTO: 223 K/UL
RBC # BLD: 4.3 M/UL
RBC # FLD: 12.9 %
WBC # FLD AUTO: 7.29 K/UL

## 2025-03-28 ENCOUNTER — TRANSCRIPTION ENCOUNTER (OUTPATIENT)
Age: 70
End: 2025-03-28

## 2025-03-31 ENCOUNTER — TRANSCRIPTION ENCOUNTER (OUTPATIENT)
Age: 70
End: 2025-03-31

## 2025-04-08 ENCOUNTER — APPOINTMENT (OUTPATIENT)
Dept: INTERNAL MEDICINE | Facility: CLINIC | Age: 70
End: 2025-04-08

## 2025-04-08 ENCOUNTER — NON-APPOINTMENT (OUTPATIENT)
Age: 70
End: 2025-04-08

## 2025-04-08 ENCOUNTER — OUTPATIENT (OUTPATIENT)
Dept: OUTPATIENT SERVICES | Facility: HOSPITAL | Age: 70
LOS: 1 days | End: 2025-04-08

## 2025-04-11 ENCOUNTER — TRANSCRIPTION ENCOUNTER (OUTPATIENT)
Age: 70
End: 2025-04-11

## 2025-04-14 ENCOUNTER — APPOINTMENT (OUTPATIENT)
Dept: PULMONOLOGY | Facility: CLINIC | Age: 70
End: 2025-04-14
Payer: MEDICARE

## 2025-04-14 VITALS
HEART RATE: 71 BPM | BODY MASS INDEX: 23.62 KG/M2 | HEIGHT: 70 IN | RESPIRATION RATE: 16 BRPM | WEIGHT: 165 LBS | OXYGEN SATURATION: 97 % | TEMPERATURE: 97.3 F

## 2025-04-14 PROCEDURE — 96372 THER/PROPH/DIAG INJ SC/IM: CPT

## 2025-04-14 RX ORDER — BENRALIZUMAB 30 MG/ML
30 INJECTION, SOLUTION SUBCUTANEOUS
Qty: 0 | Refills: 0 | Status: COMPLETED | OUTPATIENT
Start: 2025-04-14

## 2025-04-14 RX ADMIN — BENRALIZUMAB 0 MG/ML: 30 INJECTION, SOLUTION SUBCUTANEOUS at 00:00

## 2025-04-28 ENCOUNTER — TRANSCRIPTION ENCOUNTER (OUTPATIENT)
Age: 70
End: 2025-04-28

## 2025-05-09 ENCOUNTER — APPOINTMENT (OUTPATIENT)
Dept: INTERNAL MEDICINE | Facility: CLINIC | Age: 70
End: 2025-05-09

## 2025-05-09 ENCOUNTER — NON-APPOINTMENT (OUTPATIENT)
Age: 70
End: 2025-05-09

## 2025-05-12 ENCOUNTER — APPOINTMENT (OUTPATIENT)
Dept: PULMONOLOGY | Facility: CLINIC | Age: 70
End: 2025-05-12
Payer: MEDICARE

## 2025-05-12 VITALS
HEIGHT: 70 IN | HEART RATE: 60 BPM | WEIGHT: 165 LBS | BODY MASS INDEX: 23.62 KG/M2 | SYSTOLIC BLOOD PRESSURE: 150 MMHG | RESPIRATION RATE: 17 BRPM | TEMPERATURE: 97.6 F | OXYGEN SATURATION: 98 % | DIASTOLIC BLOOD PRESSURE: 79 MMHG

## 2025-05-12 PROCEDURE — 96372 THER/PROPH/DIAG INJ SC/IM: CPT

## 2025-05-12 RX ORDER — BENRALIZUMAB 30 MG/ML
30 INJECTION, SOLUTION SUBCUTANEOUS
Qty: 0 | Refills: 0 | Status: COMPLETED | OUTPATIENT
Start: 2025-05-09

## 2025-06-10 ENCOUNTER — APPOINTMENT (OUTPATIENT)
Dept: PULMONOLOGY | Facility: CLINIC | Age: 70
End: 2025-06-10
Payer: MEDICARE

## 2025-06-10 VITALS
WEIGHT: 165 LBS | DIASTOLIC BLOOD PRESSURE: 79 MMHG | SYSTOLIC BLOOD PRESSURE: 145 MMHG | RESPIRATION RATE: 17 BRPM | TEMPERATURE: 97.8 F | HEIGHT: 70 IN | OXYGEN SATURATION: 97 % | HEART RATE: 60 BPM | BODY MASS INDEX: 23.62 KG/M2

## 2025-06-10 PROCEDURE — 96372 THER/PROPH/DIAG INJ SC/IM: CPT

## 2025-06-10 RX ORDER — BENRALIZUMAB 30 MG/ML
30 INJECTION, SOLUTION SUBCUTANEOUS
Qty: 0 | Refills: 0 | Status: COMPLETED | OUTPATIENT
Start: 2025-06-09

## 2025-06-23 ENCOUNTER — TRANSCRIPTION ENCOUNTER (OUTPATIENT)
Age: 70
End: 2025-06-23

## 2025-07-14 ENCOUNTER — TRANSCRIPTION ENCOUNTER (OUTPATIENT)
Age: 70
End: 2025-07-14

## 2025-07-24 ENCOUNTER — NON-APPOINTMENT (OUTPATIENT)
Age: 70
End: 2025-07-24

## 2025-07-29 ENCOUNTER — TRANSCRIPTION ENCOUNTER (OUTPATIENT)
Age: 70
End: 2025-07-29

## 2025-08-04 ENCOUNTER — APPOINTMENT (OUTPATIENT)
Dept: PULMONOLOGY | Facility: CLINIC | Age: 70
End: 2025-08-04
Payer: MEDICARE

## 2025-08-04 VITALS
TEMPERATURE: 97.3 F | BODY MASS INDEX: 23.62 KG/M2 | OXYGEN SATURATION: 98 % | WEIGHT: 165 LBS | HEART RATE: 70 BPM | HEIGHT: 70 IN | RESPIRATION RATE: 16 BRPM

## 2025-08-04 PROCEDURE — 96372 THER/PROPH/DIAG INJ SC/IM: CPT

## 2025-08-04 RX ORDER — BENRALIZUMAB 30 MG/ML
30 INJECTION, SOLUTION SUBCUTANEOUS
Qty: 0 | Refills: 0 | Status: COMPLETED | OUTPATIENT
Start: 2025-08-04

## 2025-08-05 ENCOUNTER — APPOINTMENT (OUTPATIENT)
Dept: CARDIOLOGY | Facility: CLINIC | Age: 70
End: 2025-08-05
Payer: MEDICARE

## 2025-08-05 ENCOUNTER — RX ONLY (RX ONLY)
Age: 70
End: 2025-08-05

## 2025-08-05 ENCOUNTER — OFFICE (OUTPATIENT)
Facility: LOCATION | Age: 70
Setting detail: OPHTHALMOLOGY
End: 2025-08-05
Payer: MEDICARE

## 2025-08-05 VITALS
WEIGHT: 164 LBS | SYSTOLIC BLOOD PRESSURE: 147 MMHG | RESPIRATION RATE: 16 BRPM | TEMPERATURE: 98.1 F | HEART RATE: 55 BPM | HEIGHT: 70 IN | BODY MASS INDEX: 23.48 KG/M2 | OXYGEN SATURATION: 100 % | DIASTOLIC BLOOD PRESSURE: 88 MMHG

## 2025-08-05 DIAGNOSIS — I45.10 UNSPECIFIED RIGHT BUNDLE-BRANCH BLOCK: ICD-10-CM

## 2025-08-05 DIAGNOSIS — H11.153: ICD-10-CM

## 2025-08-05 DIAGNOSIS — E78.41 ELEVATED LIPOPROTEIN(A): ICD-10-CM

## 2025-08-05 DIAGNOSIS — Z95.5 PRESENCE OF CORONARY ANGIOPLASTY IMPLANT AND GRAFT: ICD-10-CM

## 2025-08-05 DIAGNOSIS — H53.19: ICD-10-CM

## 2025-08-05 DIAGNOSIS — I34.0 NONRHEUMATIC MITRAL (VALVE) INSUFFICIENCY: ICD-10-CM

## 2025-08-05 DIAGNOSIS — H35.372: ICD-10-CM

## 2025-08-05 DIAGNOSIS — I25.10 ATHEROSCLEROTIC HEART DISEASE OF NATIVE CORONARY ARTERY W/OUT ANGINA PECTORIS: ICD-10-CM

## 2025-08-05 DIAGNOSIS — I10 ESSENTIAL (PRIMARY) HYPERTENSION: ICD-10-CM

## 2025-08-05 DIAGNOSIS — H25.13: ICD-10-CM

## 2025-08-05 DIAGNOSIS — E78.5 HYPERLIPIDEMIA, UNSPECIFIED: ICD-10-CM

## 2025-08-05 PROCEDURE — 92004 COMPRE OPH EXAM NEW PT 1/>: CPT | Performed by: OPHTHALMOLOGY

## 2025-08-05 PROCEDURE — G2211 COMPLEX E/M VISIT ADD ON: CPT

## 2025-08-05 PROCEDURE — 99204 OFFICE O/P NEW MOD 45 MIN: CPT

## 2025-08-05 RX ORDER — TELMISARTAN 80 MG/1
80 TABLET ORAL DAILY
Qty: 90 | Refills: 3 | Status: ACTIVE | COMMUNITY
Start: 2025-08-05 | End: 1900-01-01

## 2025-08-05 ASSESSMENT — CONFRONTATIONAL VISUAL FIELD TEST (CVF)
OD_FINDINGS: FULL
OS_FINDINGS: FULL

## 2025-08-05 ASSESSMENT — REFRACTION_CURRENTRX
OD_OVR_VA: 20/
OD_SPHERE: +3.25
OS_OVR_VA: 20/
OD_CYLINDER: SPH
OS_SPHERE: +3.25
OS_VPRISM_DIRECTION: SV
OD_VPRISM_DIRECTION: SV
OS_CYLINDER: SPH

## 2025-08-05 ASSESSMENT — KERATOMETRY
OS_AXISANGLE_DEGREES: 039
OS_K1POWER_DIOPTERS: 44.00
OS_K2POWER_DIOPTERS: 44.25
OD_AXISANGLE_DEGREES: 037
OD_K2POWER_DIOPTERS: 44.50
OD_K1POWER_DIOPTERS: 44.25

## 2025-08-05 ASSESSMENT — REFRACTION_AUTOREFRACTION
OS_AXIS: 148
OD_CYLINDER: -0.25
OD_SPHERE: +1.25
OS_CYLINDER: -0.50
OD_AXIS: 129
OS_SPHERE: +1.25

## 2025-08-05 ASSESSMENT — VISUAL ACUITY
OD_BCVA: 20/25+2
OS_BCVA: 20/25+2

## 2025-08-06 ENCOUNTER — NON-APPOINTMENT (OUTPATIENT)
Age: 70
End: 2025-08-06

## 2025-08-22 ENCOUNTER — NON-APPOINTMENT (OUTPATIENT)
Age: 70
End: 2025-08-22

## 2025-09-09 ENCOUNTER — RX RENEWAL (OUTPATIENT)
Age: 70
End: 2025-09-09

## 2025-09-12 ENCOUNTER — APPOINTMENT (OUTPATIENT)
Dept: CARDIOLOGY | Facility: CLINIC | Age: 70
End: 2025-09-12
Payer: MEDICARE

## 2025-09-12 VITALS
HEIGHT: 70 IN | OXYGEN SATURATION: 97 % | BODY MASS INDEX: 23.34 KG/M2 | SYSTOLIC BLOOD PRESSURE: 120 MMHG | HEART RATE: 60 BPM | DIASTOLIC BLOOD PRESSURE: 72 MMHG | RESPIRATION RATE: 16 BRPM | WEIGHT: 163 LBS | TEMPERATURE: 97.8 F

## 2025-09-12 DIAGNOSIS — Z95.5 PRESENCE OF CORONARY ANGIOPLASTY IMPLANT AND GRAFT: ICD-10-CM

## 2025-09-12 DIAGNOSIS — I45.10 UNSPECIFIED RIGHT BUNDLE-BRANCH BLOCK: ICD-10-CM

## 2025-09-12 DIAGNOSIS — I34.0 NONRHEUMATIC MITRAL (VALVE) INSUFFICIENCY: ICD-10-CM

## 2025-09-12 PROCEDURE — 93000 ELECTROCARDIOGRAM COMPLETE: CPT

## 2025-09-12 PROCEDURE — 99214 OFFICE O/P EST MOD 30 MIN: CPT

## 2025-09-12 PROCEDURE — G2211 COMPLEX E/M VISIT ADD ON: CPT

## 2025-09-12 RX ORDER — EVOLOCUMAB 140 MG/ML
140 INJECTION, SOLUTION SUBCUTANEOUS
Qty: 3 | Refills: 1 | Status: ACTIVE | COMMUNITY
Start: 2025-09-12 | End: 1900-01-01

## 2025-09-15 ENCOUNTER — APPOINTMENT (OUTPATIENT)
Dept: PULMONOLOGY | Facility: CLINIC | Age: 70
End: 2025-09-15
Payer: MEDICARE

## 2025-09-15 VITALS
DIASTOLIC BLOOD PRESSURE: 62 MMHG | BODY MASS INDEX: 23.48 KG/M2 | SYSTOLIC BLOOD PRESSURE: 120 MMHG | HEIGHT: 70 IN | WEIGHT: 164 LBS | TEMPERATURE: 97.3 F | HEART RATE: 61 BPM | OXYGEN SATURATION: 97 % | RESPIRATION RATE: 16 BRPM

## 2025-09-15 DIAGNOSIS — R76.8 OTHER SPECIFIED ABNORMAL IMMUNOLOGICAL FINDINGS IN SERUM: ICD-10-CM

## 2025-09-15 DIAGNOSIS — J45.50 SEVERE PERSISTENT ASTHMA, UNCOMPLICATED: ICD-10-CM

## 2025-09-15 DIAGNOSIS — K21.9 GASTRO-ESOPHAGEAL REFLUX DISEASE W/OUT ESOPHAGITIS: ICD-10-CM

## 2025-09-15 DIAGNOSIS — G47.30 SLEEP APNEA, UNSPECIFIED: ICD-10-CM

## 2025-09-15 DIAGNOSIS — J30.9 ALLERGIC RHINITIS, UNSPECIFIED: ICD-10-CM

## 2025-09-15 DIAGNOSIS — R06.83 SNORING: ICD-10-CM

## 2025-09-15 DIAGNOSIS — R06.02 SHORTNESS OF BREATH: ICD-10-CM

## 2025-09-15 PROCEDURE — 99214 OFFICE O/P EST MOD 30 MIN: CPT | Mod: 25

## 2025-09-15 PROCEDURE — 95012 NITRIC OXIDE EXP GAS DETER: CPT

## 2025-09-15 PROCEDURE — ZZZZZ: CPT

## 2025-09-15 PROCEDURE — 94010 BREATHING CAPACITY TEST: CPT

## 2025-09-19 ENCOUNTER — TRANSCRIPTION ENCOUNTER (OUTPATIENT)
Age: 70
End: 2025-09-19

## 2025-09-19 DIAGNOSIS — I25.10 ATHEROSCLEROTIC HEART DISEASE OF NATIVE CORONARY ARTERY W/OUT ANGINA PECTORIS: ICD-10-CM

## 2025-09-19 DIAGNOSIS — E78.41 ELEVATED LIPOPROTEIN(A): ICD-10-CM

## 2025-09-19 DIAGNOSIS — I10 ESSENTIAL (PRIMARY) HYPERTENSION: ICD-10-CM

## 2025-09-19 DIAGNOSIS — E78.5 HYPERLIPIDEMIA, UNSPECIFIED: ICD-10-CM
